# Patient Record
Sex: FEMALE | Race: AMERICAN INDIAN OR ALASKA NATIVE | URBAN - METROPOLITAN AREA
[De-identification: names, ages, dates, MRNs, and addresses within clinical notes are randomized per-mention and may not be internally consistent; named-entity substitution may affect disease eponyms.]

---

## 2020-02-20 ENCOUNTER — INPATIENT (INPATIENT)
Facility: HOSPITAL | Age: 85
LOS: 0 days | Discharge: HOME | DRG: 392 | End: 2020-02-21
Attending: STUDENT IN AN ORGANIZED HEALTH CARE EDUCATION/TRAINING PROGRAM | Admitting: STUDENT IN AN ORGANIZED HEALTH CARE EDUCATION/TRAINING PROGRAM
Payer: COMMERCIAL

## 2020-02-20 VITALS
RESPIRATION RATE: 18 BRPM | TEMPERATURE: 98 F | HEART RATE: 90 BPM | HEIGHT: 62 IN | OXYGEN SATURATION: 99 % | SYSTOLIC BLOOD PRESSURE: 146 MMHG | WEIGHT: 126.1 LBS | DIASTOLIC BLOOD PRESSURE: 68 MMHG

## 2020-02-20 DIAGNOSIS — R42 DIZZINESS AND GIDDINESS: ICD-10-CM

## 2020-02-20 LAB
ALBUMIN SERPL ELPH-MCNC: 4.3 G/DL — SIGNIFICANT CHANGE UP (ref 3.3–5)
ALP SERPL-CCNC: 108 U/L — SIGNIFICANT CHANGE UP (ref 40–120)
ALT FLD-CCNC: 12 U/L — SIGNIFICANT CHANGE UP (ref 10–45)
ANION GAP SERPL CALC-SCNC: 15 MMOL/L — SIGNIFICANT CHANGE UP (ref 5–17)
APPEARANCE UR: CLEAR — SIGNIFICANT CHANGE UP
APTT BLD: 31.8 SEC — SIGNIFICANT CHANGE UP (ref 27.5–36.3)
AST SERPL-CCNC: 20 U/L — SIGNIFICANT CHANGE UP (ref 10–40)
BACTERIA # UR AUTO: NEGATIVE — SIGNIFICANT CHANGE UP
BASOPHILS # BLD AUTO: 0.03 K/UL — SIGNIFICANT CHANGE UP (ref 0–0.2)
BASOPHILS NFR BLD AUTO: 0.4 % — SIGNIFICANT CHANGE UP (ref 0–2)
BILIRUB SERPL-MCNC: 0.4 MG/DL — SIGNIFICANT CHANGE UP (ref 0.2–1.2)
BILIRUB UR-MCNC: NEGATIVE — SIGNIFICANT CHANGE UP
BUN SERPL-MCNC: 12 MG/DL — SIGNIFICANT CHANGE UP (ref 7–23)
CALCIUM SERPL-MCNC: 9.8 MG/DL — SIGNIFICANT CHANGE UP (ref 8.4–10.5)
CHLORIDE SERPL-SCNC: 101 MMOL/L — SIGNIFICANT CHANGE UP (ref 96–108)
CO2 SERPL-SCNC: 23 MMOL/L — SIGNIFICANT CHANGE UP (ref 22–31)
COLOR SPEC: COLORLESS — SIGNIFICANT CHANGE UP
CREAT SERPL-MCNC: 0.6 MG/DL — SIGNIFICANT CHANGE UP (ref 0.5–1.3)
DIFF PNL FLD: NEGATIVE — SIGNIFICANT CHANGE UP
EOSINOPHIL # BLD AUTO: 0.01 K/UL — SIGNIFICANT CHANGE UP (ref 0–0.5)
EOSINOPHIL NFR BLD AUTO: 0.1 % — SIGNIFICANT CHANGE UP (ref 0–6)
EPI CELLS # UR: 0 /HPF — SIGNIFICANT CHANGE UP
GLUCOSE SERPL-MCNC: 118 MG/DL — HIGH (ref 70–99)
GLUCOSE UR QL: NEGATIVE — SIGNIFICANT CHANGE UP
HCT VFR BLD CALC: 40.3 % — SIGNIFICANT CHANGE UP (ref 34.5–45)
HGB BLD-MCNC: 12.3 G/DL — SIGNIFICANT CHANGE UP (ref 11.5–15.5)
HYALINE CASTS # UR AUTO: 0 /LPF — SIGNIFICANT CHANGE UP (ref 0–7)
IMM GRANULOCYTES NFR BLD AUTO: 0.3 % — SIGNIFICANT CHANGE UP (ref 0–1.5)
INR BLD: 0.99 RATIO — SIGNIFICANT CHANGE UP (ref 0.88–1.16)
KETONES UR-MCNC: NEGATIVE — SIGNIFICANT CHANGE UP
LEUKOCYTE ESTERASE UR-ACNC: NEGATIVE — SIGNIFICANT CHANGE UP
LYMPHOCYTES # BLD AUTO: 1.64 K/UL — SIGNIFICANT CHANGE UP (ref 1–3.3)
LYMPHOCYTES # BLD AUTO: 22.8 % — SIGNIFICANT CHANGE UP (ref 13–44)
MCHC RBC-ENTMCNC: 25.9 PG — LOW (ref 27–34)
MCHC RBC-ENTMCNC: 30.5 GM/DL — LOW (ref 32–36)
MCV RBC AUTO: 85 FL — SIGNIFICANT CHANGE UP (ref 80–100)
MONOCYTES # BLD AUTO: 0.38 K/UL — SIGNIFICANT CHANGE UP (ref 0–0.9)
MONOCYTES NFR BLD AUTO: 5.3 % — SIGNIFICANT CHANGE UP (ref 2–14)
NEUTROPHILS # BLD AUTO: 5.12 K/UL — SIGNIFICANT CHANGE UP (ref 1.8–7.4)
NEUTROPHILS NFR BLD AUTO: 71.1 % — SIGNIFICANT CHANGE UP (ref 43–77)
NITRITE UR-MCNC: NEGATIVE — SIGNIFICANT CHANGE UP
NRBC # BLD: 0 /100 WBCS — SIGNIFICANT CHANGE UP (ref 0–0)
PH UR: 6 — SIGNIFICANT CHANGE UP (ref 5–8)
PLATELET # BLD AUTO: 312 K/UL — SIGNIFICANT CHANGE UP (ref 150–400)
POTASSIUM SERPL-MCNC: 4.2 MMOL/L — SIGNIFICANT CHANGE UP (ref 3.5–5.3)
POTASSIUM SERPL-SCNC: 4.2 MMOL/L — SIGNIFICANT CHANGE UP (ref 3.5–5.3)
PROT SERPL-MCNC: 7.9 G/DL — SIGNIFICANT CHANGE UP (ref 6–8.3)
PROT UR-MCNC: NEGATIVE — SIGNIFICANT CHANGE UP
PROTHROM AB SERPL-ACNC: 11.4 SEC — SIGNIFICANT CHANGE UP (ref 10–12.9)
RBC # BLD: 4.74 M/UL — SIGNIFICANT CHANGE UP (ref 3.8–5.2)
RBC # FLD: 13.4 % — SIGNIFICANT CHANGE UP (ref 10.3–14.5)
RBC CASTS # UR COMP ASSIST: 0 /HPF — SIGNIFICANT CHANGE UP (ref 0–4)
SODIUM SERPL-SCNC: 139 MMOL/L — SIGNIFICANT CHANGE UP (ref 135–145)
SP GR SPEC: 1 — LOW (ref 1.01–1.02)
UROBILINOGEN FLD QL: NEGATIVE — SIGNIFICANT CHANGE UP
WBC # BLD: 7.2 K/UL — SIGNIFICANT CHANGE UP (ref 3.8–10.5)
WBC # FLD AUTO: 7.2 K/UL — SIGNIFICANT CHANGE UP (ref 3.8–10.5)
WBC UR QL: 0 /HPF — SIGNIFICANT CHANGE UP (ref 0–5)

## 2020-02-20 PROCEDURE — 70498 CT ANGIOGRAPHY NECK: CPT | Mod: 26

## 2020-02-20 PROCEDURE — 99291 CRITICAL CARE FIRST HOUR: CPT

## 2020-02-20 PROCEDURE — 71260 CT THORAX DX C+: CPT | Mod: 26

## 2020-02-20 PROCEDURE — 70496 CT ANGIOGRAPHY HEAD: CPT | Mod: 26

## 2020-02-20 RX ORDER — SODIUM CHLORIDE 9 MG/ML
1000 INJECTION INTRAMUSCULAR; INTRAVENOUS; SUBCUTANEOUS ONCE
Refills: 0 | Status: COMPLETED | OUTPATIENT
Start: 2020-02-20 | End: 2020-02-20

## 2020-02-20 RX ORDER — BENZOCAINE AND MENTHOL 5; 1 G/100ML; G/100ML
1 LIQUID ORAL ONCE
Refills: 0 | Status: COMPLETED | OUTPATIENT
Start: 2020-02-20 | End: 2020-02-20

## 2020-02-20 RX ORDER — SODIUM CHLORIDE 9 MG/ML
1000 INJECTION, SOLUTION INTRAVENOUS
Refills: 0 | Status: DISCONTINUED | OUTPATIENT
Start: 2020-02-20 | End: 2020-02-21

## 2020-02-20 RX ADMIN — Medication 1 MILLIGRAM(S): at 20:33

## 2020-02-20 RX ADMIN — SODIUM CHLORIDE 1000 MILLILITER(S): 9 INJECTION INTRAMUSCULAR; INTRAVENOUS; SUBCUTANEOUS at 18:59

## 2020-02-20 RX ADMIN — BENZOCAINE AND MENTHOL 1 LOZENGE: 5; 1 LIQUID ORAL at 22:45

## 2020-02-20 NOTE — ED PROVIDER NOTE - CRITICAL CARE PROVIDED
interpretation of diagnostic studies/consult w/ pt's family directly relating to pts condition/documentation/additional history taking/consultation with other physicians/direct patient care (not related to procedure)

## 2020-02-20 NOTE — ED PROVIDER NOTE - PROGRESS NOTE DETAILS
patient unable to swallow at this time and feels unable to eat drink, gi consulted, awaiting neuro recommendations, Hospitalist paged for admission. Hospitalist accepts patient   Based on patient's history and physical exam, as well as the results of today's workup, I feel that patient warrants admission to the hospital for further workup/evaluation and continued management. I discussed the findings of today's workup with the patient and addressed the patient's questions and concerns. The patient was agreeable with admission. Our team spoke with the inpatient receiving team who accepted the patient for admission and subsequently took over the patient's care at the time of admission.

## 2020-02-20 NOTE — ED ADULT NURSE NOTE - NSIMPLEMENTINTERV_GEN_ALL_ED
Implemented All Universal Safety Interventions:  [place] to call system. Call bell, personal items and telephone within reach. Instruct patient to call for assistance. Room bathroom lighting operational. Non-slip footwear when patient is off stretcher. Physically safe environment: no spills, clutter or unnecessary equipment. Stretcher in lowest position, wheels locked, appropriate side rails in place.

## 2020-02-20 NOTE — CONSULT NOTE ADULT - SUBJECTIVE AND OBJECTIVE BOX
MRN-[id number]  chief complaint: vertigo    HPI: 83 yo F with PMH of DM, HTN, HLD presents to the ED with vertigo. Family assists in providing the history. Family reports that she developed room spinning sensation on . Symptoms are on and off. She reports that symptoms are worse when sleeping. Denies that symptoms are particularly worse with movement; she states that symptoms are on and off either at rest or with movement. no nausea, vomiting, diplopia, runny nose, fevers. no dysarthria. family reports patient has been having odynophagia and dry cough. family also reports patient has been having unsteady gait. at baseline, she walks independently. family reports that she has had multiple episodes like this in the past, that can last up to a week, and then spontaneously resolve. she has taken meclizine in the past which had previously helped. this time, meclizine was not helping. no tinnitus, numbness.      PAST MEDICAL & SURGICAL HISTORY:  DM, HTN, HLD    FAMILY HISTORY: n.c    Social Hx:  lives with family    Home Medications:    MEDICATIONS  (STANDING):  dextrose 5% + sodium chloride 0.45%. 1000 milliLiter(s) (125 mL/Hr) IV Continuous <Continuous>    Allergies  No Known Allergies    REVIEW OF SYSTEMS	    ROS: Pertinent positives in HPI, all other ROS were reviewed and are negative.      Vital Signs Last 24 Hrs  T(C): 37.1 (2020 17:42), Max: 37.1 (2020 17:42)  T(F): 98.8 (2020 17:42), Max: 98.8 (2020 17:42)  HR: 63 (2020 21:45) (63 - 90)  BP: 132/67 (2020 21:45) (132/67 - 154/72)  BP(mean): --  RR: 16 (2020 21:45) (16 - 18)  SpO2: 100% (2020 21:45) (99% - 100%)    GENERAL EXAM:  Constitutional: awake and alert. NAD  Extremities: no edema, no cyanosis  Vascular: no carotid bruits  Musculoskeletal: no joint swelling/tenderness, no abnormal movements  Skin: no rashes    NEUROLOGICAL EXAM:  MS: awake and alert, oriented to person, place, not to month or year, fluent, attends b/l; normal attention. some difficulty follow commands.  CN: VFF, EOMI, no facial asymmetry  Motor: Strength: 5/5 4x.   DTR 2+ symm.    Coordination: no dysmetria  Gait: walks with narrow base, unsteady on feet    +head thrust with corrective saccade    Labs:   cbc                      12.3   7.20  )-----------( 312      ( 2020 18:50 )             40.3     [id number]    139  |  101  |  12  ----------------------------<  118<H>  4.2   |  23  |  0.60    Ca    9.8      2020 18:50    TPro  7.9  /  Alb  4.3  /  TBili  0.4  /  DBili  x   /  AST  20  /  ALT  12  /  AlkPhos  108  02-20    CoagsPT/INR - ( 2020 18:50 )   PT: 11.4 sec;   INR: 0.99 ratio         PTT - ( 2020 18:50 )  PTT:31.8 sec    LFTsLIVER FUNCTIONS - ( 2020 18:50 )  Alb: 4.3 g/dL / Pro: 7.9 g/dL / ALK PHOS: 108 U/L / ALT: 12 U/L / AST: 20 U/L / GGT: x           UAUrinalysis Basic - ( 2020 18:49 )    Color: Colorless / Appearance: Clear / S.005 / pH: x  Gluc: x / Ketone: Negative  / Bili: Negative / Urobili: Negative   Blood: x / Protein: Negative / Nitrite: Negative   Leuk Esterase: Negative / RBC: 0 /hpf / WBC 0 /HPF   Sq Epi: x / Non Sq Epi: 0 /hpf / Bacteria: Negative      CSF  Immunological Labs    Radiology:  -CT Head: < from: CT Head No Cont (20 @ 21:33) >  IMPRESSION:     CT brain: No acute intracranial hemorrhage, mass effect, midline shift.     CTA neck: No hemodynamically significant stenosis by NASCET criteria. No vascular dissection.    CTA brain: No major vessel occlusion or proximal stenosis. No aneurysm or vascular malformation.    < end of copied text >

## 2020-02-20 NOTE — ED PROVIDER NOTE - ATTENDING CONTRIBUTION TO CARE
Satish Mata MD, FACEP: In this physician's medical judgement based on clinical history and physical exam, patient with foreign body sensation in upper throat and difficulty swallowing x 2-3 days. Patient had symptoms of vertigo 7 days prior. Left neck with rash, dry, red, itching.  CN 2 normal sight, 3,4,6 eomi and eyelid movement normal, 5,7 normal gritting of teeth and opening of mouth and sensation to face, 8 normal hearing for patient, 9,10 normal swallowing and phonation, 11 normal shoulder shrug, 12 normal tongue movement and articulation, normal coordination, normal finger to nose, normal heel to shin, negative Romberg, no pronator drift, no gait testing, 5/5 strength in upper and lower extremities, normal sensory throughout, alert and oriented to person, place, time, and situation.   left neck with dry erythematous rash with some flaking of skin  mmm  trachea midline, no thyroid masses detected  soft, non-distended   concern for central vertigionus symptoms and now difficulty swallowing  Satish Mata MD, FACEP: In this physician's medical judgement based on clinical history and physical exam, possible posterior cva/central cva, will get iv, cbc, cmp, ekg, ent consult and reassess   Will follow up on labs, analgesia, imaging, reassess and disposition as clinically indicated. Satish Mata MD, FACEP: In this physician's medical judgement based on clinical history and physical exam, patient with foreign body sensation in upper throat and difficulty swallowing x 2-3 days. Patient had symptoms of vertigo 7 days prior. Left neck with rash, dry, red, itching.  CN 2 normal sight, 3,4,6 eomi and eyelid movement normal, 5,7 normal gritting of teeth and opening of mouth and sensation to face, 8 normal hearing for patient, 9,10 normal swallowing and phonation, 11 normal shoulder shrug, 12 normal tongue movement and articulation, normal coordination, normal finger to nose, normal heel to shin, negative Romberg, no pronator drift, no gait testing, 5/5 strength in upper and lower extremities, normal sensory throughout, alert and oriented to person, place, time, and situation.   left neck with dry erythematous rash with some flaking of skin  mmm  trachea midline, no thyroid masses detected  soft, non-distended   concern for central vertiginous symptoms and now difficulty swallowing  Satish Mata MD, FACEP: In this physician's medical judgement based on clinical history and physical exam, possible posterior cva/central cva, with additional issue of difficulty swallowing with questionable impaction/ achalasia/ dysmotility  will get iv, cbc, cmp, ekg, ent consult and reassess   Will follow up on labs, analgesia, imaging, reassess and disposition as clinically indicated.

## 2020-02-20 NOTE — ED PROVIDER NOTE - OBJECTIVE STATEMENT
84 y.o. female coming in with dizziness x 1 weeks and feeling of something stuck in her chest and difficult swallowing for 3 days.  Pt started with feeling of room spinning 7 days ago, was seen by PCP started on meclizine without improvement.  Dizziness is all the time without nausea or vomiting.  Not lightheaded, no CP or SoB.  No focal deficit, not made better or worse by head movement/rest.  Pt also slowly developed a feeling of a lump deep in her upper chest about 4 days ago and now is having difficulty swallowing foods.  Also noters some SoB without cough or wheezing.  Did not come on after eating, currently only tolerating water.  Nothing makes this better, swallowing makes it worse.

## 2020-02-20 NOTE — ED ADULT NURSE NOTE - OBJECTIVE STATEMENT
85 y/o female presents c/o room like spinning for the past week. States she was evaluated by PCP and given Meclozine with no relief. Also c/o 3-4 days ago onset of pain in throat, difficulty swallowing, decreased PO intake. Denies chest pain, sob, n/v/d, abdominal pain, f/c, urinary symptoms, hematuria. A&Ox4, hypertensive, skin warm dry and intact, MAEx4, lungs CTA, abd soft nondistended. Patient's bed in the lowest position, explained plan of care to patient and family members. Will continue to reassess.

## 2020-02-20 NOTE — ED ADULT NURSE NOTE - CHIEF COMPLAINT QUOTE
difficulty swallowing, feels "lump" in throat, "trouble breathing", decreased po intake, itching and redness around neck

## 2020-02-21 ENCOUNTER — TRANSCRIPTION ENCOUNTER (OUTPATIENT)
Age: 85
End: 2020-02-21

## 2020-02-21 VITALS
TEMPERATURE: 98 F | HEART RATE: 55 BPM | SYSTOLIC BLOOD PRESSURE: 129 MMHG | OXYGEN SATURATION: 96 % | DIASTOLIC BLOOD PRESSURE: 59 MMHG | RESPIRATION RATE: 18 BRPM

## 2020-02-21 DIAGNOSIS — R09.89 OTHER SPECIFIED SYMPTOMS AND SIGNS INVOLVING THE CIRCULATORY AND RESPIRATORY SYSTEMS: ICD-10-CM

## 2020-02-21 DIAGNOSIS — E11.9 TYPE 2 DIABETES MELLITUS WITHOUT COMPLICATIONS: ICD-10-CM

## 2020-02-21 DIAGNOSIS — I10 ESSENTIAL (PRIMARY) HYPERTENSION: ICD-10-CM

## 2020-02-21 DIAGNOSIS — R13.10 DYSPHAGIA, UNSPECIFIED: ICD-10-CM

## 2020-02-21 DIAGNOSIS — Z02.9 ENCOUNTER FOR ADMINISTRATIVE EXAMINATIONS, UNSPECIFIED: ICD-10-CM

## 2020-02-21 DIAGNOSIS — E78.5 HYPERLIPIDEMIA, UNSPECIFIED: ICD-10-CM

## 2020-02-21 DIAGNOSIS — Z29.9 ENCOUNTER FOR PROPHYLACTIC MEASURES, UNSPECIFIED: ICD-10-CM

## 2020-02-21 DIAGNOSIS — R42 DIZZINESS AND GIDDINESS: ICD-10-CM

## 2020-02-21 LAB
CULTURE RESULTS: SIGNIFICANT CHANGE UP
GLUCOSE BLDC GLUCOMTR-MCNC: 85 MG/DL — SIGNIFICANT CHANGE UP (ref 70–99)
GLUCOSE BLDC GLUCOMTR-MCNC: 87 MG/DL — SIGNIFICANT CHANGE UP (ref 70–99)
HCOV PNL SPEC NAA+PROBE: DETECTED
RAPID RVP RESULT: DETECTED
SPECIMEN SOURCE: SIGNIFICANT CHANGE UP

## 2020-02-21 PROCEDURE — 70450 CT HEAD/BRAIN W/O DYE: CPT

## 2020-02-21 PROCEDURE — 71260 CT THORAX DX C+: CPT

## 2020-02-21 PROCEDURE — 31525 DX LARYNGOSCOPY EXCL NB: CPT

## 2020-02-21 PROCEDURE — 87486 CHLMYD PNEUM DNA AMP PROBE: CPT

## 2020-02-21 PROCEDURE — 85610 PROTHROMBIN TIME: CPT

## 2020-02-21 PROCEDURE — 99222 1ST HOSP IP/OBS MODERATE 55: CPT | Mod: GC

## 2020-02-21 PROCEDURE — 99222 1ST HOSP IP/OBS MODERATE 55: CPT

## 2020-02-21 PROCEDURE — 81001 URINALYSIS AUTO W/SCOPE: CPT

## 2020-02-21 PROCEDURE — 87633 RESP VIRUS 12-25 TARGETS: CPT

## 2020-02-21 PROCEDURE — 70498 CT ANGIOGRAPHY NECK: CPT

## 2020-02-21 PROCEDURE — 85027 COMPLETE CBC AUTOMATED: CPT

## 2020-02-21 PROCEDURE — 31505 DIAGNOSTIC LARYNGOSCOPY: CPT

## 2020-02-21 PROCEDURE — 85730 THROMBOPLASTIN TIME PARTIAL: CPT

## 2020-02-21 PROCEDURE — 12345: CPT | Mod: NC,GC

## 2020-02-21 PROCEDURE — 93005 ELECTROCARDIOGRAM TRACING: CPT | Mod: XU

## 2020-02-21 PROCEDURE — 87581 M.PNEUMON DNA AMP PROBE: CPT

## 2020-02-21 PROCEDURE — 87798 DETECT AGENT NOS DNA AMP: CPT

## 2020-02-21 PROCEDURE — 96374 THER/PROPH/DIAG INJ IV PUSH: CPT | Mod: XU

## 2020-02-21 PROCEDURE — 99223 1ST HOSP IP/OBS HIGH 75: CPT | Mod: GC

## 2020-02-21 PROCEDURE — 80053 COMPREHEN METABOLIC PANEL: CPT

## 2020-02-21 PROCEDURE — 99285 EMERGENCY DEPT VISIT HI MDM: CPT | Mod: 25

## 2020-02-21 PROCEDURE — 70496 CT ANGIOGRAPHY HEAD: CPT

## 2020-02-21 PROCEDURE — 87086 URINE CULTURE/COLONY COUNT: CPT

## 2020-02-21 PROCEDURE — 82962 GLUCOSE BLOOD TEST: CPT

## 2020-02-21 RX ORDER — PANTOPRAZOLE SODIUM 20 MG/1
40 TABLET, DELAYED RELEASE ORAL
Refills: 0 | Status: DISCONTINUED | OUTPATIENT
Start: 2020-02-21 | End: 2020-02-21

## 2020-02-21 RX ORDER — SODIUM CHLORIDE 9 MG/ML
1000 INJECTION, SOLUTION INTRAVENOUS
Refills: 0 | Status: DISCONTINUED | OUTPATIENT
Start: 2020-02-21 | End: 2020-02-21

## 2020-02-21 RX ORDER — PANTOPRAZOLE SODIUM 20 MG/1
1 TABLET, DELAYED RELEASE ORAL
Qty: 30 | Refills: 0
Start: 2020-02-21 | End: 2020-03-21

## 2020-02-21 RX ORDER — GLUCAGON INJECTION, SOLUTION 0.5 MG/.1ML
1 INJECTION, SOLUTION SUBCUTANEOUS ONCE
Refills: 0 | Status: DISCONTINUED | OUTPATIENT
Start: 2020-02-21 | End: 2020-02-21

## 2020-02-21 RX ORDER — ATORVASTATIN CALCIUM 80 MG/1
20 TABLET, FILM COATED ORAL AT BEDTIME
Refills: 0 | Status: DISCONTINUED | OUTPATIENT
Start: 2020-02-21 | End: 2020-02-21

## 2020-02-21 RX ORDER — ATORVASTATIN CALCIUM 80 MG/1
1 TABLET, FILM COATED ORAL
Qty: 0 | Refills: 0 | DISCHARGE

## 2020-02-21 RX ORDER — AMLODIPINE BESYLATE 2.5 MG/1
5 TABLET ORAL DAILY
Refills: 0 | Status: DISCONTINUED | OUTPATIENT
Start: 2020-02-21 | End: 2020-02-21

## 2020-02-21 RX ORDER — LISINOPRIL 2.5 MG/1
1 TABLET ORAL
Qty: 0 | Refills: 0 | DISCHARGE

## 2020-02-21 RX ORDER — ACETAMINOPHEN 500 MG
1000 TABLET ORAL ONCE
Refills: 0 | Status: DISCONTINUED | OUTPATIENT
Start: 2020-02-21 | End: 2020-02-21

## 2020-02-21 RX ORDER — INSULIN LISPRO 100/ML
VIAL (ML) SUBCUTANEOUS
Refills: 0 | Status: DISCONTINUED | OUTPATIENT
Start: 2020-02-21 | End: 2020-02-21

## 2020-02-21 RX ORDER — LIDOCAINE 4 G/100G
15 CREAM TOPICAL
Qty: 840 | Refills: 0
Start: 2020-02-21 | End: 2020-02-27

## 2020-02-21 RX ORDER — MECLIZINE HCL 12.5 MG
25 TABLET ORAL
Refills: 0 | Status: DISCONTINUED | OUTPATIENT
Start: 2020-02-21 | End: 2020-02-21

## 2020-02-21 RX ORDER — NYSTATIN 500MM UNIT
5 POWDER (EA) MISCELLANEOUS
Qty: 120 | Refills: 0
Start: 2020-02-21 | End: 2020-02-26

## 2020-02-21 RX ORDER — DEXTROSE 50 % IN WATER 50 %
12.5 SYRINGE (ML) INTRAVENOUS ONCE
Refills: 0 | Status: DISCONTINUED | OUTPATIENT
Start: 2020-02-21 | End: 2020-02-21

## 2020-02-21 RX ORDER — DEXTROSE 50 % IN WATER 50 %
25 SYRINGE (ML) INTRAVENOUS ONCE
Refills: 0 | Status: DISCONTINUED | OUTPATIENT
Start: 2020-02-21 | End: 2020-02-21

## 2020-02-21 RX ORDER — AMLODIPINE BESYLATE 2.5 MG/1
1 TABLET ORAL
Qty: 0 | Refills: 0 | DISCHARGE

## 2020-02-21 RX ORDER — NYSTATIN 500MM UNIT
500000 POWDER (EA) MISCELLANEOUS EVERY 6 HOURS
Refills: 0 | Status: DISCONTINUED | OUTPATIENT
Start: 2020-02-21 | End: 2020-02-21

## 2020-02-21 RX ORDER — LISINOPRIL 2.5 MG/1
2.5 TABLET ORAL DAILY
Refills: 0 | Status: DISCONTINUED | OUTPATIENT
Start: 2020-02-21 | End: 2020-02-21

## 2020-02-21 RX ORDER — DEXTROSE 50 % IN WATER 50 %
15 SYRINGE (ML) INTRAVENOUS ONCE
Refills: 0 | Status: DISCONTINUED | OUTPATIENT
Start: 2020-02-21 | End: 2020-02-21

## 2020-02-21 RX ORDER — INSULIN LISPRO 100/ML
VIAL (ML) SUBCUTANEOUS AT BEDTIME
Refills: 0 | Status: DISCONTINUED | OUTPATIENT
Start: 2020-02-21 | End: 2020-02-21

## 2020-02-21 RX ORDER — METFORMIN HYDROCHLORIDE 850 MG/1
1 TABLET ORAL
Qty: 0 | Refills: 0 | DISCHARGE

## 2020-02-21 RX ORDER — CHLORHEXIDINE GLUCONATE 213 G/1000ML
15 SOLUTION TOPICAL
Qty: 210 | Refills: 0
Start: 2020-02-21 | End: 2020-02-27

## 2020-02-21 RX ADMIN — Medication 500000 UNIT(S): at 05:42

## 2020-02-21 RX ADMIN — Medication 25 MILLIGRAM(S): at 05:42

## 2020-02-21 RX ADMIN — AMLODIPINE BESYLATE 5 MILLIGRAM(S): 2.5 TABLET ORAL at 05:42

## 2020-02-21 RX ADMIN — SODIUM CHLORIDE 50 MILLILITER(S): 9 INJECTION, SOLUTION INTRAVENOUS at 12:24

## 2020-02-21 RX ADMIN — LISINOPRIL 2.5 MILLIGRAM(S): 2.5 TABLET ORAL at 05:42

## 2020-02-21 RX ADMIN — SODIUM CHLORIDE 75 MILLILITER(S): 9 INJECTION, SOLUTION INTRAVENOUS at 02:56

## 2020-02-21 RX ADMIN — SODIUM CHLORIDE 125 MILLILITER(S): 9 INJECTION, SOLUTION INTRAVENOUS at 00:41

## 2020-02-21 RX ADMIN — PANTOPRAZOLE SODIUM 40 MILLIGRAM(S): 20 TABLET, DELAYED RELEASE ORAL at 05:42

## 2020-02-21 NOTE — CONSULT NOTE ADULT - SUBJECTIVE AND OBJECTIVE BOX
Chief Complaint:  Patient is a 84y old  Female who presents with a chief complaint of Sore throat and odynophagia (2020 07:37)      HPI: Pt is an 85 yo F with PMH of HTN, HLD, T2DM, and intermittent vertigo for the past 10 years who presents with cough, sore throat and odynophagia for the past 3-4 days. GI consulted for odynophagia and abrnoaml CT. Pt has been having dry cough for few days. Then developed pain with swallowing right after the intiial swallow. Never hd this prior. She additionalyl ocasionally feels food stuck in chest. Recently placed on meclizine for vertigo but not other changes. Denies fever or chills, shortness of breath. she had episode of food intolerance but may have been related to vertigo. Denies any food getting stuck completely or difficulty with drooling or not tolerating saliva. Denies melena or hematochezia. No weight loss. No prior smoking or alcohol use. From [facility] living in  for 35 years w/o recent travels. no prior EGD.    Allergies:  No Known Allergies      Home Medications:    Hospital Medications:  acetaminophen  IVPB .. 1000 milliGRAM(s) IV Intermittent once PRN  amLODIPine   Tablet 5 milliGRAM(s) Oral daily  atorvastatin 20 milliGRAM(s) Oral at bedtime  dextrose 40% Gel 15 Gram(s) Oral once PRN  dextrose 5% + lactated ringers. 1000 milliLiter(s) IV Continuous <Continuous>  dextrose 5%. 1000 milliLiter(s) IV Continuous <Continuous>  dextrose 50% Injectable 12.5 Gram(s) IV Push once  dextrose 50% Injectable 25 Gram(s) IV Push once  dextrose 50% Injectable 25 Gram(s) IV Push once  glucagon  Injectable 1 milliGRAM(s) IntraMuscular once PRN  insulin lispro (HumaLOG) corrective regimen sliding scale   SubCutaneous three times a day before meals  insulin lispro (HumaLOG) corrective regimen sliding scale   SubCutaneous at bedtime  lisinopril 2.5 milliGRAM(s) Oral daily  meclizine 25 milliGRAM(s) Oral two times a day  pantoprazole    Tablet 40 milliGRAM(s) Oral before breakfast      PMHX/PSHX:  Diabetes  Hyperlipidemia  Hypertension  Vertigo  No significant past surgical history      Family history:  No pertinent family history in first degree relatives      Social History:     ROS:     General:  No wt loss, fevers, chills, night sweats, fatigue,   Eyes:  Good vision, no reported pain  ENT:  No sore throat, pain, runny nose, dysphagia  CV:  No pain, palpitations, hypo/hypertension  Resp:  No dyspnea, cough, tachypnea, wheezing  GI:  See HPI  :  No pain, bleeding, incontinence, nocturia  Muscle:  No pain, weakness  Neuro:  No weakness, tingling, memory problems  Psych:  No fatigue, insomnia, mood problems, depression  Endocrine:  No polyuria, polydipsia, cold/heat intolerance  Heme:  No petechiae, ecchymosis, easy bruisability  Skin:  No rash, edema      PHYSICAL EXAM:     Vital Signs:  Vital Signs Last 24 Hrs  T(C): 36.9 (2020 12:23), Max: 37.1 (2020 17:42)  T(F): 98.4 (2020 12:23), Max: 98.8 (2020 17:42)  HR: 59 (2020 12:23) (51 - 90)  BP: 150/66 (2020 12:23) (123/52 - 154/72)  BP(mean): --  RR: 16 (2020 12:23) (16 - 18)  SpO2: 99% (2020 12:23) (98% - 100%)  Daily Height in cm: 157.48 (2020 16:54)    Daily     GENERAL:  Appears stated age, well-groomed, well-nourished, no distress  HEENT:  NC/AT,  conjunctivae clear and pink  CHEST:  Full & symmetric excursion, no increased effort  HEART:  Regular rhythm, no JVD  ABDOMEN:  Soft, non-tender, non-distended, normoactive bowel sounds,  no masses , no hepatosplenomegaly  EXTREMITIES:  no cyanosis, clubbing or edema  SKIN:  No rash/erythema/ecchymoses/petechiae/wounds/abscess/warm/dry  NEURO:  Alert, oriented, nonfocal    LABS:                        12.3   7.20  )-----------( 312      ( 2020 18:50 )             40.3     02-20    139  |  101  |  12  ----------------------------<  118<H>  4.2   |  23  |  0.60    Ca    9.8      2020 18:50    TPro  7.9  /  Alb  4.3  /  TBili  0.4  /  DBili  x   /  AST  20  /  ALT  12  /  AlkPhos  108      LIVER FUNCTIONS - ( 2020 18:50 )  Alb: 4.3 g/dL / Pro: 7.9 g/dL / ALK PHOS: 108 U/L / ALT: 12 U/L / AST: 20 U/L / GGT: x           PT/INR - ( 2020 18:50 )   PT: 11.4 sec;   INR: 0.99 ratio         PTT - ( 2020 18:50 )  PTT:31.8 sec  Urinalysis Basic - ( 2020 18:49 )    Color: Colorless / Appearance: Clear / S.005 / pH: x  Gluc: x / Ketone: Negative  / Bili: Negative / Urobili: Negative   Blood: x / Protein: Negative / Nitrite: Negative   Leuk Esterase: Negative / RBC: 0 /hpf / WBC 0 /HPF   Sq Epi: x / Non Sq Epi: 0 /hpf / Bacteria: Negative    Imaging:    < from: CT Chest w/ IV Cont (20 @ 21:21) >  FINDINGS: Artifact from the patient's arms and motion degrades images.    LUNGS AND AIRWAYS: Patent central airways. No focal consolidation. Subsegmental bibasilar atelectasis.    PLEURA: No pleural effusion or pneumothorax.    MEDIASTINUM AND VERONICA: Subcentimeter lymph nodes without lymphadenopathy.    VESSELS: Atherosclerotic change of the thoracic aorta, great vessel and coronary arteries. Ectatic ascending aorta (4.0 cm). Mildly enlarged main pulmonary artery (3.2 cm),which can be seen in pulmonary arterial hypertension.      HEART: Mildly enlarged heart. No pericardial effusion.     CHEST WALL AND LOWER NECK: Heterogeneous thyroid gland with small hypodense foci.    VISUALIZED UPPER ABDOMEN: Diffusely prominent wall of the esophagus containing debris. Partially distended stomach, limiting evaluation. Right hepatic calcified granuloma. Colon diverticulosis. Excluded contrast in the visualized proximal renal collecting system.    BONES: Osteopenia. Leftward curvature and degenerative changes of the spine. Small loose bodies in the region of the left subcoracoid bursa.    IMPRESSION:     Diffusely prominent wall of the esophagus, which may be due to partial distention and/or esophagitis although underlying lesion/neoplasm cannot be excluded. Recommend clinical correlation and follow-up upper endoscopy.    Heterogeneous thyroid gland, which may further characterized on a nonemergent ultrasound.    Additional findings as described.

## 2020-02-21 NOTE — PROGRESS NOTE ADULT - ATTENDING COMMENTS
Patient seen and examined with resident team. Evaluated by ENT and GI. Plan for outpatient endoscopy next week (arranged for Wednesday). She is clinically stable, non-toxic appearing. No drooling, respiratory distress, or signs of airway compromise and she was scoped by ENT which showed findings consistent with GERD.  Labs all within normal limits. RVP positive for Coronavirus.   1. Odynaphagia: Planned for outpatient endoscopy. Will D/C with Viscous lido, nystatin swish and swallow.   2. Coronavirus: Continue with supportive care  Discussed with family at bedside. 36 minutes spent discharge planning.

## 2020-02-21 NOTE — CONSULT NOTE ADULT - PROBLEM SELECTOR RECOMMENDATION 9
FOE: Airway patent, no foreign bodies visualized, Post cricoid edema, pachydermia consistent with GERD.   - Recommend PPI BID.   - CT Chest: Diffusely prominent wall of the esophagus, which may be due to partial distention and/or esophagitis although underlying lesion/neoplasm cannot be excluded. Recommend clinical correlation and follow-up upper endoscopy.   - GI consult.   - Speech and swallow evaluation for dysphagia.   - Call ENT with questions.     MONIQUE COTTO PA-C.   # [id number]  ENT PA. FOE: Airway patent, no foreign bodies visualized, Post cricoid edema, pachydermia consistent with GERD.   - Recommend PPI BID.   - CT Chest: Diffusely prominent wall of the esophagus, which may be due to partial distention and/or esophagitis although underlying lesion/neoplasm cannot be excluded. Recommend clinical correlation and follow-up upper endoscopy.   - GI consult.   - Speech and swallow evaluation for dysphagia.   - Elevate HOB.   - Pain meds prn.   - Call ENT with questions.     MONIQUE COTTO PA-C.   # [id number]  ENT PA.

## 2020-02-21 NOTE — PROGRESS NOTE ADULT - PROBLEM SELECTOR PLAN 2
-Pt with history of intermittent vertigo for 10 years and now with same sxs for the past 5 days. No tinnitus, hearing loss, FND, and CTA head with no acute findings.  -Can also be component of orthostatics as pt with decreased PO intake in setting of odynophagia  -meclizine  -IVF  -vestibular therapy  -f/u neuro recs Pt with history of intermittent vertigo for 10 years and now with same sxs for the past 5 days. No tinnitus, hearing loss, FND, and CTA head with no acute findings. Can also be component of orthostatics as pt with decreased PO intake in setting of odynophagia  - c/w meclizine  - s/p 1L NS in ED; c/w D5Q  - c/w vestibular therapy  -f/u neuro recs

## 2020-02-21 NOTE — DISCHARGE NOTE PROVIDER - NSDCCPCAREPLAN_GEN_ALL_CORE_FT
PRINCIPAL DISCHARGE DIAGNOSIS  Diagnosis: Odynophagia  Assessment and Plan of Treatment: You presented to the hospital because you had pain with swallowing for one week. You received imaging of your throat which revealed non-specific findings if swelling and thickening of your esophagus. The Ear, Nose, Throat doctors performed a laryngoscopy of the back of your throat which did not reveal anything unusual. You were started on nystatin swish and swallow for presumed candida infection. The Gastroenterology doctors were consulted who recommended an outpatient upper endoscopy on February 26th, 2020 at 3 PM at [facility].  Please go to your appointment and follow up with your outpatient gastroenterologist. Please also follow up with your outpatient primary care physician within 1-2 weeks of your discharge.      SECONDARY DISCHARGE DIAGNOSES  Diagnosis: Dizziness  Assessment and Plan of Treatment: When you presented to the hospital you complained of dizziness. You received a CT of your head and your neck arteries which did not show any signs of stroke, narrowing of your arteries, or bleeding. Please take meclizine as needed for your dizzines. If your dizziness worsens or you have persistent nausea/vomiting, please return to the emergency room immediately

## 2020-02-21 NOTE — H&P ADULT - PROBLEM SELECTOR PLAN 6
Transitions of Care Status:  1.  Name of PCP:Dr. Bunch [phone]  2.  PCP Contacted on Admission: [ ] Y    [ ] N    3.  PCP contacted at Discharge: [ ] Y    [ ] N    [ ] N/A  4.  Post-Discharge Appointment Date and Location:  5.  Summary of Handoff given to PCP:

## 2020-02-21 NOTE — PROGRESS NOTE ADULT - PROBLEM SELECTOR PLAN 7
DVT PPx  -SCDs for now until decision of endoscopy DVT PPx:  - SCDs for now until decision of endoscopy

## 2020-02-21 NOTE — PROGRESS NOTE ADULT - PROBLEM SELECTOR PLAN 1
-CT chest with prominent wall of esophagus, distension Vs esophagitis.  -Given the patients complaints of odynophagia, will start on protonix and nystatin swish and swallow, although per radiology read, cannot r/o malignancy with esophageal distension.   -f/u RVP  -f/u ENT recs  -GI consult for endoscopy  -NPO except meds for now until GI states whether they will perform endoscopy Worsening odynophagia in the past week; sensation of something being stuck in her throat. CT chest with prominent wall of esophagus, distension Vs esophagitis. Although, per radiology read, cannot r/o malignancy with esophageal distension.   - c/w protonix and nystatin swish and swallow  - RVP positive for coronavirus  - ENT no acute intervention, per ENT  - GI has seen the patient and is recommending outpatient f/u

## 2020-02-21 NOTE — H&P ADULT - NSHPPHYSICALEXAM_GEN_ALL_CORE
.  VITAL SIGNS:  T(C): 36.8 (02-21-20 @ 02:15), Max: 37.1 (02-20-20 @ 17:42)  T(F): 98.2 (02-21-20 @ 02:15), Max: 98.8 (02-20-20 @ 17:42)  HR: 51 (02-21-20 @ 02:15) (51 - 90)  BP: 123/52 (02-21-20 @ 02:15) (123/52 - 154/72)  BP(mean): --  RR: 18 (02-21-20 @ 02:15) (16 - 18)  SpO2: 98% (02-21-20 @ 02:15) (98% - 100%)  Wt(kg): --    PHYSICAL EXAM:    Constitutional: WDWN resting comfortably in bed; NAD  Head: NC/AT  Eyes: PERRL, EOMI, anicteric sclera  ENT: no nasal discharge, MMM, no erythema or exudates  Neck: supple; no JVD appreciated  Respiratory: CTA B/L; no W/R/R, no increased work of breathing  Cardiac: +S1/S2; RRR; no M/R/G  Gastrointestinal: soft, NT/ND; no rebound or guarding; +BSx4  Extremities: WWP, no cyanosis; no peripheral edema  Musculoskeletal: NROM x4; no joint swelling, tenderness or erythema  Vascular: 2+ radial, DP pulses B/L  Dermatologic: skin warm, dry and intact  Neurologic: Alert and oriented, CNII-XII grossly intact, strength and sensation intact bilat upper and lower extremities. FTN and no dysdiadokinesia. Normal saccades, no nystagmus    Psychiatric: affect and characteristics of appearance, verbalizations, behaviors are appropriate VITAL SIGNS:  T(C): 36.8 (02-21-20 @ 02:15), Max: 37.1 (02-20-20 @ 17:42)  T(F): 98.2 (02-21-20 @ 02:15), Max: 98.8 (02-20-20 @ 17:42)  HR: 51 (02-21-20 @ 02:15) (51 - 90)  BP: 123/52 (02-21-20 @ 02:15) (123/52 - 154/72)  BP(mean): --  RR: 18 (02-21-20 @ 02:15) (16 - 18)  SpO2: 98% (02-21-20 @ 02:15) (98% - 100%)  Wt(kg): --    PHYSICAL EXAM:    Constitutional: WDWN resting comfortably in bed; NAD  Head: NC/AT  Eyes: PERRL, EOMI, anicteric sclera  ENT: no nasal discharge, MMM, no erythema or exudates  Neck: supple; no JVD appreciated  Respiratory: CTA B/L; no W/R/R, no increased work of breathing  Cardiac: +S1/S2; RRR; no M/R/G  Gastrointestinal: soft, NT/ND; no rebound or guarding; +BSx4  Extremities: WWP, no cyanosis; no peripheral edema  Musculoskeletal: NROM x4; no joint swelling, tenderness or erythema  Vascular: 2+ radial, DP pulses B/L  Dermatologic: skin warm, dry and intact  Neurologic: Alert and oriented, CNII-XII grossly intact, strength and sensation intact bilat upper and lower extremities. FTN and no dysdiadokinesia. Normal saccades, no nystagmus    Psychiatric: affect and characteristics of appearance, verbalizations, behaviors are appropriate

## 2020-02-21 NOTE — H&P ADULT - PROBLEM SELECTOR PLAN 2
-Pt with history of intermittent vertigo for 10 years and now with same sxs for the past 5 days. No tinnitus, hearing loss, FND, and CTA head with no acute findings.  -meclizine  -vestibular therapy  -f/u neuro recs -Pt with history of intermittent vertigo for 10 years and now with same sxs for the past 5 days. No tinnitus, hearing loss, FND, and CTA head with no acute findings.  -Can also be component of orthostatics as pt with decreased PO intake in setting of odynophagia  -meclizine  -IVF  -vestibular therapy  -f/u neuro recs

## 2020-02-21 NOTE — H&P ADULT - ATTENDING COMMENTS
83 yo woman with PMH of HTN, HLD, DMII, and intermittent vertigo for the past 10 years who presents with sore throat and odynophagia for the past 7 days. Daughter at bedside and interprets for patient. Reviewed HPI and ROS.  Vitals reviewed and physically examined patient at bedside: Agree with resident's findings. Patient in no acute distress  Labs, imaging and EKG personally reviewed.  Odynophagia to solids and liquids. Per ENT eval no acute pathology in oropharynx. CT chest notable for "Diffusely prominent wall of the esophagus, which may be due to partial distention and/or esophagitis although underlying lesion/neoplasm cannot be excluded." Continue with PPI BID. NPO pending speech and swallow and GI evaluation  Vertigo: No acute pathology on CT head or CTA head/neck to suggest acute neuro event. Neuro consult reviewed and appreciated. Meclizine prn  Patient previously unknown to me and I was assigned to precept this case with housestaff resident, Dr. Winston. My involvement in this case consisted only of the initial history, physical and management plan. Primary day team to assume care in AM.

## 2020-02-21 NOTE — DISCHARGE NOTE PROVIDER - HOSPITAL COURSE
HPI per admitting resident:         83 YO F with Mhx of HTN, HLD, DMII, and intermittent vertigo for the past 10 years who presents with sore throat and odynophagia for the past 7 days. Pt was in her usual state of health when she started to have a nonproductive cough, sore throat, and odynopahia with pain upon eating solid food and then with liquids, as well as sensation of food stuck in throat. PO intake has decreased in setting of discomfort. Pt denies runny nose, fever, or myalgias. Additionally, she has been episodes of dizziness, a senseation of the room spinning, for the past 5 days that mostly occurs with change in position, including left to right while laying flat and going from a sitting to a standing position. She states this is similar to her prior episodes and was prescribed meclizine by her PCP a few days ago. She denies any head trauma, tinnitus, change in hearing or vision, weakness, nausea, vomiting, abd pain, or diarrhea, or constipation, Pt also denies any chest pain, palpitations, or shortness of breath.         Hospital course:         In the ED, vitals WNL.RVP was noted to be + for coronavirus. Patient endorsed vertigo that started Sunday, so received CTA of neck and CT head which were negative for acute stroke or bleed. Neuro was consulted who recommended continuing meclizine and outpatient vestibular therapy.  CT chest revealed diffusely prominent wall of esophageal wall which can be 2/2 esophagitis vs esophageal distention. Patient was started on nystatin swish and swallow as well as well as a PPI. ENT was consulted who performed a laryngoscopy and patient was noted to have a normal oropharyngx and no foreign object was noted. GI was consulted and patient was admitted to medicine.         On the medicine floors, patient was continued on PPI and nystatin swish and swallow. Per GI, given that patient is +coronavirus, upper endoscopy was deferred for outpatient on 2/26 for 3 PM at [facility]. Patient will be discharged with viscous lidocaine, nystatin swish and swallow, and hiblicans

## 2020-02-21 NOTE — PROGRESS NOTE ADULT - SUBJECTIVE AND OBJECTIVE BOX
Mike Silva MD  Internal Medicine, PGY-1  Beeper: [phone] ([facility])/ [id number] ([facility])     Subjective:    Patient is a 84y old  Female who presents with a chief complaint of Sore throat and odynophagia (2020 01:47)    Patient was seen and examined at bedside this AM. No acute overnight events. Denied fever, chills, nausea, vomiting, CP, palpitations, coughing, wheezing, SOB, and abdominal pain.      MEDICATIONS  (STANDING):  amLODIPine   Tablet 5 milliGRAM(s) Oral daily  atorvastatin 20 milliGRAM(s) Oral at bedtime  dextrose 5%. 1000 milliLiter(s) (50 mL/Hr) IV Continuous <Continuous>  dextrose 50% Injectable 12.5 Gram(s) IV Push once  dextrose 50% Injectable 25 Gram(s) IV Push once  dextrose 50% Injectable 25 Gram(s) IV Push once  insulin lispro (HumaLOG) corrective regimen sliding scale   SubCutaneous three times a day before meals  insulin lispro (HumaLOG) corrective regimen sliding scale   SubCutaneous at bedtime  lactated ringers. 1000 milliLiter(s) (75 mL/Hr) IV Continuous <Continuous>  lisinopril 2.5 milliGRAM(s) Oral daily  meclizine 25 milliGRAM(s) Oral two times a day  nystatin    Suspension 684272 Unit(s) Oral every 6 hours  pantoprazole    Tablet 40 milliGRAM(s) Oral before breakfast    MEDICATIONS  (PRN):  dextrose 40% Gel 15 Gram(s) Oral once PRN Blood Glucose LESS THAN 70 milliGRAM(s)/deciliter  glucagon  Injectable 1 milliGRAM(s) IntraMuscular once PRN Glucose LESS THAN 70 milligrams/deciliter      Objective:    Vitals: Vital Signs Last 24 Hrs  T(C): 36.4 (20 @ 05:40), Max: 37.1 (20 @ 17:42)  T(F): 97.5 (20 @ 05:40), Max: 98.8 (20 @ 17:42)  HR: 56 (20 @ 05:40) (51 - 90)  BP: 147/71 (20 @ 05:40) (123/52 - 154/72)  BP(mean): --  RR: 18 (20 @ 05:40) (16 - 18)  SpO2: 98% (20 @ 05:40) (98% - 100%)              I&O's Summary      PHYSICAL EXAM:  GENERAL: NAD, well-groomed, well-developed  HEAD:  Atraumatic, Normocephalic  EYES: EOMI, PERRLA, conjunctiva and sclera clear  ENMT: No tonsillar erythema, exudates, or enlargement; Moist mucous membranes, Good dentition, No lesions  NECK: Supple, No JVD, Normal thyroid  CHEST/LUNG: Clear to auscultation bilaterally; No rales, rhonchi, wheezing, or rubs  HEART: Regular rate and rhythm; No murmurs, rubs, or gallops  ABDOMEN: Soft, Nontender, Nondistended; Bowel sounds present  EXTREMITIES:  2+ Peripheral Pulses, No clubbing, cyanosis, or edema  LYMPH: No lymphadenopathy noted  SKIN: No rashes or lesions  NERVOUS SYSTEM:  Alert & Oriented X3, Good concentration; Motor Strength 5/5 B/L upper and lower extremities; DTRs 2+ intact and symmetric                                             LABS:      139  |  101  |  12  ----------------------------<  118<H>  4.2   |  23  |  0.60    Ca    9.8      2020 18:50    TPro  7.9  /  Alb  4.3  /  TBili  0.4  /  DBili  x   /  AST  20  /  ALT  12  /  AlkPhos  108        PT/INR - ( 2020 18:50 )   PT: 11.4 sec;   INR: 0.99 ratio         PTT - ( 2020 18:50 )  PTT:31.8 sec                Urinalysis Basic - ( 2020 18:49 )    Color: Colorless / Appearance: Clear / S.005 / pH: x  Gluc: x / Ketone: Negative  / Bili: Negative / Urobili: Negative   Blood: x / Protein: Negative / Nitrite: Negative   Leuk Esterase: Negative / RBC: 0 /hpf / WBC 0 /HPF   Sq Epi: x / Non Sq Epi: 0 /hpf / Bacteria: Negative                                  12.3   7.20  )-----------( 312      ( 2020 18:50 )             40.3     CAPILLARY BLOOD GLUCOSE            RECENT CULTURES:      TELEMETRY:    ECG:    TTE:    RADIOLOGY & ADDITIONAL TESTS:    Imaging Personally Reviewed:  [ ] YES  [ ] NO    Consultants involved in case:   Consultant(s) Notes Reviewed:  [ ] YES  [ ] NO:   Care Discussed with Consultants/Other Providers [ ] YES  [ ] NO Mike Silva MD  Internal Medicine, PGY-1  Beeper: [phone] ([facility])/ [id number] ([facility])     Subjective:    Patient is a 84y old  Female who presents with a chief complaint of Sore throat and odynophagia (2020 01:47)    Patient was seen and examined at bedside this AM; patient's family member was at bedside for translation. She was admitted overnight due to odynophagia and tested positive for coronavirus. This AM, endorsing left-sided neck pain and a rash under her eyes & neck. Denied regurgitation, epigastric pain, or history of GERD. Denied fever, chills, nausea, vomiting, CP, palpitations, coughing, wheezing, SOB, and abdominal pain.      MEDICATIONS  (STANDING):  amLODIPine   Tablet 5 milliGRAM(s) Oral daily  atorvastatin 20 milliGRAM(s) Oral at bedtime  dextrose 5%. 1000 milliLiter(s) (50 mL/Hr) IV Continuous <Continuous>  dextrose 50% Injectable 12.5 Gram(s) IV Push once  dextrose 50% Injectable 25 Gram(s) IV Push once  dextrose 50% Injectable 25 Gram(s) IV Push once  insulin lispro (HumaLOG) corrective regimen sliding scale   SubCutaneous three times a day before meals  insulin lispro (HumaLOG) corrective regimen sliding scale   SubCutaneous at bedtime  lactated ringers. 1000 milliLiter(s) (75 mL/Hr) IV Continuous <Continuous>  lisinopril 2.5 milliGRAM(s) Oral daily  meclizine 25 milliGRAM(s) Oral two times a day  nystatin    Suspension 807755 Unit(s) Oral every 6 hours  pantoprazole    Tablet 40 milliGRAM(s) Oral before breakfast    MEDICATIONS  (PRN):  dextrose 40% Gel 15 Gram(s) Oral once PRN Blood Glucose LESS THAN 70 milliGRAM(s)/deciliter  glucagon  Injectable 1 milliGRAM(s) IntraMuscular once PRN Glucose LESS THAN 70 milligrams/deciliter      Objective:    Vitals: Vital Signs Last 24 Hrs  T(C): 36.4 (20 @ 05:40), Max: 37.1 (20 @ 17:42)  T(F): 97.5 (20 @ 05:40), Max: 98.8 (20 @ 17:42)  HR: 56 (20 @ 05:40) (51 - 90)  BP: 147/71 (20 @ 05:40) (123/52 - 154/72)  RR: 18 (20 @ 05:40) (16 - 18)  SpO2: 98% (20 @ 05:40) (98% - 100%)                  PHYSICAL EXAM:  GENERAL: Elderly female resting comfortably in bed in NAD; patient's daughter at the bedside  HEAD:  Atraumatic, Normocephalic  EYES: EOMI, conjunctiva and sclera clear  ENMT: No tonsillar erythema, exudates, or enlargement; Moist mucous membranes, Good dentition, No lesions  NECK: Supple, Normal thyroid  CHEST/LUNG: Clear to auscultation bilaterally; No rales, rhonchi, wheezing, or rubs  HEART: Regular rate and rhythm; No murmurs, rubs, or gallops  ABDOMEN: Soft, Nontender, Nondistended; Bowel sounds present  EXTREMITIES:  2+ Peripheral Pulses, No clubbing, cyanosis, or edema  LYMPH: No lymphadenopathy noted  SKIN: No rashes or lesions  NERVOUS SYSTEM:  Alert & Oriented X3, Good concentration; Motor Strength 5/5 B/L upper and lower extremities; DTRs 2+ intact and symmetric                                               LABS:      139  |  101  |  12  ----------------------------<  118<H>  4.2   |  23  |  0.60    Ca    9.8      2020 18:50    TPro  7.9  /  Alb  4.3  /  TBili  0.4  /  DBili  x   /  AST  20  /  ALT  12  /  AlkPhos  108        PT/INR - ( 2020 18:50 )   PT: 11.4 sec;   INR: 0.99 ratio         PTT - ( 2020 18:50 )  PTT:31.8 sec                Urinalysis Basic - ( 2020 18:49 )    Color: Colorless / Appearance: Clear / S.005 / pH: x  Gluc: x / Ketone: Negative  / Bili: Negative / Urobili: Negative   Blood: x / Protein: Negative / Nitrite: Negative   Leuk Esterase: Negative / RBC: 0 /hpf / WBC 0 /HPF   Sq Epi: x / Non Sq Epi: 0 /hpf / Bacteria: Negative                                  12.3   7.20  )-----------( 312      ( 2020 18:50 )             40.3     CAPILLARY BLOOD GLUCOSE            RECENT CULTURES:      TELEMETRY:    ECG:    TTE:    RADIOLOGY & ADDITIONAL TESTS:    Imaging Personally Reviewed:  [ ] YES  [ ] NO    Consultants involved in case:   Consultant(s) Notes Reviewed:  [ ] YES  [ ] NO:   Care Discussed with Consultants/Other Providers [ ] YES  [ ] NO Mike Silva MD  Internal Medicine, PGY-1  Beeper: [phone] ([facility])/ [phone] ([facility])     Subjective:    Patient is a 84y old  Female who presents with a chief complaint of Sore throat and odynophagia (2020 01:47)    Patient was seen and examined at bedside this AM; patient's family member was at bedside for translation. She was admitted overnight due to odynophagia and tested positive for coronavirus. This AM, endorsing left-sided neck pain and a rash under her eyes & neck. Denied regurgitation, epigastric pain, or history of GERD. Denied fever, chills, nausea, vomiting, CP, palpitations, coughing, wheezing, SOB, and abdominal pain.      MEDICATIONS  (STANDING):  amLODIPine   Tablet 5 milliGRAM(s) Oral daily  atorvastatin 20 milliGRAM(s) Oral at bedtime  dextrose 5%. 1000 milliLiter(s) (50 mL/Hr) IV Continuous <Continuous>  dextrose 50% Injectable 12.5 Gram(s) IV Push once  dextrose 50% Injectable 25 Gram(s) IV Push once  dextrose 50% Injectable 25 Gram(s) IV Push once  insulin lispro (HumaLOG) corrective regimen sliding scale   SubCutaneous three times a day before meals  insulin lispro (HumaLOG) corrective regimen sliding scale   SubCutaneous at bedtime  lactated ringers. 1000 milliLiter(s) (75 mL/Hr) IV Continuous <Continuous>  lisinopril 2.5 milliGRAM(s) Oral daily  meclizine 25 milliGRAM(s) Oral two times a day  nystatin    Suspension 038699 Unit(s) Oral every 6 hours  pantoprazole    Tablet 40 milliGRAM(s) Oral before breakfast    MEDICATIONS  (PRN):  dextrose 40% Gel 15 Gram(s) Oral once PRN Blood Glucose LESS THAN 70 milliGRAM(s)/deciliter  glucagon  Injectable 1 milliGRAM(s) IntraMuscular once PRN Glucose LESS THAN 70 milligrams/deciliter      Objective:    Vitals: Vital Signs Last 24 Hrs  T(C): 36.4 (20 @ 05:40), Max: 37.1 (20 @ 17:42)  T(F): 97.5 (20 @ 05:40), Max: 98.8 (20 @ 17:42)  HR: 56 (20 @ 05:40) (51 - 90)  BP: 147/71 (20 @ 05:40) (123/52 - 154/72)  RR: 18 (20 @ 05:40) (16 - 18)  SpO2: 98% (20 @ 05:40) (98% - 100%)                  PHYSICAL EXAM:  GENERAL: Elderly female resting comfortably in bed in NAD; patient's daughter at the bedside  HEAD:  Atraumatic, Normocephalic  EYES: EOMI, conjunctiva and sclera clear  ENMT: No tonsillar erythema, exudates, or enlargement; Moist mucous membranes, Good dentition, No lesions  NECK: Supple, Normal thyroid  CHEST/LUNG: Clear to auscultation bilaterally; No rales, rhonchi, wheezing, or rubs  HEART: Regular rate and rhythm; No murmurs, rubs, or gallops  ABDOMEN: Soft, Nontender, Nondistended; Bowel sounds present  EXTREMITIES: No LE edema  LYMPH: No lymphadenopathy noted  SKIN: patient with pruritic, erythematous rash on the left side of her neck. Non-TTP, no drainage observed, non-vesicular  NERVOUS SYSTEM: Awake and alert, only speaks Malayalam                                            LABS:      139  |  101  |  12  ----------------------------<  118<H>  4.2   |  23  |  0.60    Ca    9.8      2020 18:50    TPro  7.9  /  Alb  4.3  /  TBili  0.4  /  DBili  x   /  AST  20  /  ALT  12  /  AlkPhos  108    PT/INR - ( 2020 18:50 )   PT: 11.4 sec;   INR: 0.99 ratio    PTT - ( 2020 18:50 )  PTT:31.8 sec                Urinalysis Basic - ( 2020 18:49 )  Color: Colorless / Appearance: Clear / S.005 / pH: x  Gluc: x / Ketone: Negative  / Bili: Negative / Urobili: Negative   Blood: x / Protein: Negative / Nitrite: Negative   Leuk Esterase: Negative / RBC: 0 /hpf / WBC 0 /HPF   Sq Epi: x / Non Sq Epi: 0 /hpf / Bacteria: Negative                          12.3   7.20  )-----------( 312      ( 2020 18:50 )             40.3

## 2020-02-21 NOTE — H&P ADULT - ASSESSMENT
85 YO F with Mhx of HTN, HLD, DMII, and intermittent vertigo for the past 10 years who presents with sore throat and odynophagia for the past 7 days.

## 2020-02-21 NOTE — H&P ADULT - NSHPREVIEWOFSYSTEMS_GEN_ALL_CORE
REVIEW OF SYSTEMS:    CONSTITUTIONAL: No weakness, fevers or chills  EYES/ENT: + vertigo and sore throat with odynophagia No visual or hearing  changes  NECK: No pain or stiffness  RESPIRATORY: + nonproductive cough No wheezing, hemoptysis; No shortness of breath  CARDIOVASCULAR: No chest pain or palpitations  GASTROINTESTINAL: No abdominal or epigastric pain. No nausea, vomiting, or hematemesis; No diarrhea or constipation. No melena or hematochezia.  GENITOURINARY: No dysuria, frequency or hematuria  NEUROLOGICAL: No numbness or weakness  SKIN: No itching, burning, rashes, or lesions   All other review of systems is negative unless indicated above.

## 2020-02-21 NOTE — H&P ADULT - NSHPLABSRESULTS_GEN_ALL_CORE
12.3   7  )-----------( 312      ( 2020 18:50 )             40.3           139  |  101  |  12  ----------------------------<  118<H>  4.2   |  23  |  0.60    Ca    9.8      2020 18:50    TPro  7.9  /  Alb  4.3  /  TBili  0.4  /  DBili  x   /  AST  20  /  ALT  12  /  AlkPhos  108                Urinalysis Basic - ( 2020 18:49 )    Color: Colorless / Appearance: Clear / S.005 / pH: x  Gluc: x / Ketone: Negative  / Bili: Negative / Urobili: Negative   Blood: x / Protein: Negative / Nitrite: Negative   Leuk Esterase: Negative / RBC: 0 /hpf / WBC 0 /HPF   Sq Epi: x / Non Sq Epi: 0 /hpf / Bacteria: Negative    PT/INR - ( 2020 18:50 )   PT: 11.4 sec;   INR: 0.99 ratio      PTT - ( 2020 18:50 )  PTT:31.8 sec    Lactate Trend  EXAM:  CT CHEST IC                        PROCEDURE DATE:  2020            INTERPRETATION:  CLINICAL INFORMATION: Difficulty swallowing, clinical concern for esophageal obstruction/mass.     COMPARISON: None.    PROCEDURE:   CT of the Chest was performed following the injection of intravenous contrast for concurrently performed CTA of the head and neck.  Please see separate report of concurrently performed CTA head/neck for details of contrast administration.  Sagittal and coronal reformats were performed.    FINDINGS: Artifact from the patient's arms and motion degrades images.    LUNGS AND AIRWAYS: Patent central airways. No focal consolidation. Subsegmental bibasilar atelectasis.    PLEURA: No pleural effusion or pneumothorax.    MEDIASTINUM AND VERONICA: Subcentimeter lymph nodes without lymphadenopathy.    VESSELS: Atherosclerotic change of the thoracic aorta, great vessel and coronary arteries. Ectatic ascending aorta (4.0 cm). Mildly enlarged main pulmonary artery (3.2 cm),which can be seen in pulmonary arterial hypertension.      HEART: Mildly enlarged heart. No pericardial effusion.     CHEST WALL AND LOWER NECK: Heterogeneous thyroid gland with small hypodense foci.    VISUALIZED UPPER ABDOMEN: Diffusely prominent wall of the esophagus containing debris. Partially distended stomach, limiting evaluation. Right hepatic calcified granuloma. Colon diverticulosis. Excluded contrast in the visualized proximal renal collecting system.    BONES: Osteopenia. Leftward curvature and degenerative changes of the spine. Small loose bodies in the region of the left subcoracoid bursa.    IMPRESSION:     Diffusely prominent wall of the esophagus, which may be due to partial distention and/or esophagitis although underlying lesion/neoplasm cannot be excluded. Recommend clinical correlation and follow-up upper endoscopy.    Heterogeneous thyroid gland, which may further characterized on a nonemergent ultrasound.    Additional findings as described. Personally reviewed labs and noted in detail below: no luekocytosis, no eosinophilia            12.3   7.20  )-----------( 312      ( 2020 18:50 )             40.3           139  |  101  |  12  ----------------------------<  118<H>  4.2   |  23  |  0.60    Ca    9.8      2020 18:50    TPro  7.9  /  Alb  4.3  /  TBili  0.4  /  DBili  x   /  AST  20  /  ALT  12  /  AlkPhos  108              Urinalysis Basic - ( 2020 18:49 )    Color: Colorless / Appearance: Clear / S.005 / pH: x  Gluc: x / Ketone: Negative  / Bili: Negative / Urobili: Negative   Blood: x / Protein: Negative / Nitrite: Negative   Leuk Esterase: Negative / RBC: 0 /hpf / WBC 0 /HPF   Sq Epi: x / Non Sq Epi: 0 /hpf / Bacteria: Negative    PT/INR - ( 2020 18:50 )   PT: 11.4 sec;   INR: 0.99 ratio      PTT - ( 2020 18:50 )  PTT:31.8 sec    Lactate Trend    EXAM:  CT CHEST IC                        PROCEDURE DATE:  2020          INTERPRETATION:  CLINICAL INFORMATION: Difficulty swallowing, clinical concern for esophageal obstruction/mass.     COMPARISON: None.    PROCEDURE:   CT of the Chest was performed following the injection of intravenous contrast for concurrently performed CTA of the head and neck.  Please see separate report of concurrently performed CTA head/neck for details of contrast administration.  Sagittal and coronal reformats were performed.    FINDINGS: Artifact from the patient's arms and motion degrades images.    LUNGS AND AIRWAYS: Patent central airways. No focal consolidation. Subsegmental bibasilar atelectasis.    PLEURA: No pleural effusion or pneumothorax.    MEDIASTINUM AND VERONICA: Subcentimeter lymph nodes without lymphadenopathy.    VESSELS: Atherosclerotic change of the thoracic aorta, great vessel and coronary arteries. Ectatic ascending aorta (4.0 cm). Mildly enlarged main pulmonary artery (3.2 cm),which can be seen in pulmonary arterial hypertension.      HEART: Mildly enlarged heart. No pericardial effusion.     CHEST WALL AND LOWER NECK: Heterogeneous thyroid gland with small hypodense foci.    VISUALIZED UPPER ABDOMEN: Diffusely prominent wall of the esophagus containing debris. Partially distended stomach, limiting evaluation. Right hepatic calcified granuloma. Colon diverticulosis. Excluded contrast in the visualized proximal renal collecting system.    BONES: Osteopenia. Leftward curvature and degenerative changes of the spine. Small loose bodies in the region of the left subcoracoid bursa.    IMPRESSION:     Diffusely prominent wall of the esophagus, which may be due to partial distention and/or esophagitis although underlying lesion/neoplasm cannot be excluded. Recommend clinical correlation and follow-up upper endoscopy.    Heterogeneous thyroid gland, which may further characterized on a nonemergent ultrasound.    Additional findings as described.    Personally reviewed EKG: Sinus Bill 59 bpm QTc 407 no ST segment changes

## 2020-02-21 NOTE — CONSULT NOTE ADULT - ATTENDING COMMENTS
83 yo F with PMH of DM, HTN, HLD presents to the ED with vertigo for past week. Translation provided by Pt's grandaumichi and in -law.  She has had room-spinning for last few days, even when lying down, comes and goes, worse with any head movement, accompanied by nausea and vomiting. She has hx of BPPV in the past which improved after Meclizine. This time it has not been improving. Per family she has had poorer appetite over the past few  months, not drinking fluids. She was found to be RSV positive on admission.    On exam she is awake, alert, at baseline she is not oriented to date, or current events, speech fluent, follows simple and complex commands. No nystagmus, ORIN, EOMI, no facial droop, No dysmetria on FNF, full strength throughout. unable to do gait exam as pt was being taken upstairs.     Likely dizziness in the setting of viral infection and dehydration. No findings on exam, but given vague sense of dizziness would rule out stroke.   MRI brain w/o  aspirin 81mg  PT eval.   IV fluid hydration, encourage PO intake.
85 yo F pmh HTN/HL, DM presenting with new onset dysphagia/odynophagia with CT scan showing 'esophageal thickening'.  Has + coronavirus on RSV.  Defer endo today give this.  Offered IP EGD next week vs. OP EGD middle/end of next week.  Patient/family desire the latter.  Treat empirically with Swish and Swallow, Viscous Lidocaine, and diet as tolerated.  Further care to be determined post EGD.

## 2020-02-21 NOTE — PROGRESS NOTE ADULT - PROBLEM SELECTOR PLAN 6
Transitions of Care Status:  1.  Name of PCP:Dr. Bunch [phone]  2.  PCP Contacted on Admission: [ ] Y    [ ] N    3.  PCP contacted at Discharge: [ ] Y    [ ] N    [ ] N/A  4.  Post-Discharge Appointment Date and Location:  5.  Summary of Handoff given to PCP: Transitions of Care Status:  1.  Name of PCP: Dr. Bunch [phone]  2.  PCP Contacted on Admission: [ ] Y    [ ] N    3.  PCP contacted at Discharge: [ ] Y    [ ] N    [ ] N/A  4.  Post-Discharge Appointment Date and Location:  5.  Summary of Handoff given to PCP:

## 2020-02-21 NOTE — DISCHARGE NOTE PROVIDER - CARE PROVIDER_API CALL
Francisco Garzon)  Gastroenterology; Internal Medicine  [address] [place] [address]  Phone: [phone]  Fax: [phone]  Follow Up Time:

## 2020-02-21 NOTE — DISCHARGE NOTE NURSING/CASE MANAGEMENT/SOCIAL WORK - PATIENT PORTAL LINK FT
You can access the FollowMyHealth Patient Portal offered by [facility] by registering at the following website: [email or web address]. By joining [employer]’s FollowMyHealth portal, you will also be able to view your health information using other applications (apps) compatible with our system.

## 2020-02-21 NOTE — DISCHARGE NOTE PROVIDER - NSDCMRMEDTOKEN_GEN_ALL_CORE_FT
amLODIPine 5 mg oral tablet: 1 tab(s) orally once a day  atorvastatin 20 mg oral tablet: 1 tab(s) orally once a day  chlorhexidine 0.12% mucous membrane liquid: 15 milliliter(s) orally 2 times a day   Glucophage 500 mg oral tablet: 1 tab(s) orally 2 times a day  Lidocaine Viscous 2% mucous membrane solution: Gargle every 3 hours  as needed for throat pain   lisinopril 2.5 mg oral tablet: 1 tab(s) orally once a day  nystatin 100,000 units/mL oral suspension: 5 milliliter(s) orally (swish and swallow) every 6 hours   pantoprazole 40 mg oral delayed release tablet: 1 tab(s) orally once a day (before a meal)

## 2020-02-21 NOTE — CONSULT NOTE ADULT - ASSESSMENT
Impression:  # Abnormal CT: prominent wall of esophagus, DDx includes malignancy, ulceration, food debris, lipoma, esophagitis. No concern for food bolus impaction. No hx of GERD, smoking or risk factors for cancer.  # Odynophagia: DDx viral/bacterial/fungal infection, pill induced, ulceration from stasis in setting of possible malignancy  # coronavirus: new diagnosis, likely explains cough and URI symptoms  # Hypertension  # T2DM    Recommendation:  - given active viral infection, will defer for procedure as outpatient  - discuss with granddaughter, plan for EGD as outpatient on Wednesday at 3pm in [facility] endoscopy suite  - trial PPI, viscous lidocaine, and nystatin oral swish and swallow empirically  - soft diet  - supportive care, from GI perspective no inpatient intervention planned    Pepe Michel  [phone]  [id number]  Please call/page on call fellow on weekends and weekdays after 5pm
Impression: Probably peripheral vertigo. She has prior history of similar symptoms. no focal neurological deficits to suggest acute ischemic stroke.    Plan:  -can c/w meclizine  -vestibular therapy
83 YO F with PMH of HTN, T2DM, HLD,  intermittent vertigo for the past 10 years who presents with sore throat and odynophagia for the past 7 days. Pt reports a sensation of food getting stuck in her throat. Per family at bedside, pt was tolerating regular diet at home. ENT was consulted for evaluation of Foreign body sensation in throat. Pt able to swallow secretions. Not in respiratory distress on evaluation. Fiberoptic Indirect Laryngoscopy showed, Post cricoid edema, pachydermia consistent with GERD. CT Chest showed, " Diffusely prominent wall of the esophagus, which may be due to partial distention and/or esophagitis although underlying lesion/neoplasm cannot be excluded. Recommend clinical correlation and follow-up upper endoscopy. Heterogeneous thyroid gland, which may further characterized on a nonemergent ultrasound"

## 2020-02-21 NOTE — H&P ADULT - PROBLEM SELECTOR PLAN 1
-CT chest with prominent wall of esophagus, distension Vs esophagitis.  -Given the patients complaints of odynophagia, will start on nystatin swish and swallow, although per radiology read, cannot r/o malignancy with esophageal distension.   -f/u RVP  -f/u ENT recs  -GI consult for endoscopy  -NPO for now until GI states whether they will perform endoscopy -CT chest with prominent wall of esophagus, distension Vs esophagitis.  -Given the patients complaints of odynophagia, will start on protonix and nystatin swish and swallow, although per radiology read, cannot r/o malignancy with esophageal distension.   -f/u RVP  -f/u ENT recs  -GI consult for endoscopy  -NPO except meds for now until GI states whether they will perform endoscopy

## 2020-02-21 NOTE — DISCHARGE NOTE PROVIDER - NSDCFUADDAPPT_GEN_ALL_CORE_FT
You have an upper endoscopy appointment on February 26th, 2020 at 3 PM in the endoscopy suite and [facility]

## 2020-02-21 NOTE — H&P ADULT - HISTORY OF PRESENT ILLNESS
83 YO F with Mhx of HTN, HLD, DMII, and intermittent vertigo for the past 10 years who presents with sore throat and odynophagia for the past 7 days. Pt was in her usual state of health when she started to have a nonproductive cough, sore throat, and odynopahia with pain upon eating solid food and then with liquids. Of note she has also had a cough for the past week, but denies runny nose, fever, or myalgias. Additionally, she has been episodes of dizziness, a senseation of the room spinning, for the past 5 days that mostly occurs with change in position, including left to right while laying flat and going from a sitting to a standing position. She states this is similar to her prior episodes and was prescribed meclizine by her PCP a few days ago. She denies any head trauma, tinnitus, change in hearing or vision, weakness, nausea, vomiting, abd pain, or diarrhea, or constipation, Pt also denies any chest pain, palpitations, or shortness of breath. 83 YO F with Mhx of HTN, HLD, DMII, and intermittent vertigo for the past 10 years who presents with sore throat and odynophagia for the past 7 days. Pt was in her usual state of health when she started to have a nonproductive cough, sore throat, and odynopahia with pain upon eating solid food and then with liquids, as well as sensation of food stuck in throat. Of note she has also had a cough for the past week, but denies runny nose, fever, or myalgias. Additionally, she has been episodes of dizziness, a senseation of the room spinning, for the past 5 days that mostly occurs with change in position, including left to right while laying flat and going from a sitting to a standing position. She states this is similar to her prior episodes and was prescribed meclizine by her PCP a few days ago. She denies any head trauma, tinnitus, change in hearing or vision, weakness, nausea, vomiting, abd pain, or diarrhea, or constipation, Pt also denies any chest pain, palpitations, or shortness of breath. 85 YO F with Mhx of HTN, HLD, DMII, and intermittent vertigo for the past 10 years who presents with sore throat and odynophagia for the past 7 days. Pt was in her usual state of health when she started to have a nonproductive cough, sore throat, and odynopahia with pain upon eating solid food and then with liquids, as well as sensation of food stuck in throat. Pt  denies runny nose, fever, or myalgias. Additionally, she has been episodes of dizziness, a senseation of the room spinning, for the past 5 days that mostly occurs with change in position, including left to right while laying flat and going from a sitting to a standing position. She states this is similar to her prior episodes and was prescribed meclizine by her PCP a few days ago. She denies any head trauma, tinnitus, change in hearing or vision, weakness, nausea, vomiting, abd pain, or diarrhea, or constipation, Pt also denies any chest pain, palpitations, or shortness of breath. 83 YO F with Mhx of HTN, HLD, DMII, and intermittent vertigo for the past 10 years who presents with sore throat and odynophagia for the past 7 days. Pt was in her usual state of health when she started to have a nonproductive cough, sore throat, and odynopahia with pain upon eating solid food and then with liquids, as well as sensation of food stuck in throat. PO intake has decreased in setting of discomfort. Pt denies runny nose, fever, or myalgias. Additionally, she has been episodes of dizziness, a senseation of the room spinning, for the past 5 days that mostly occurs with change in position, including left to right while laying flat and going from a sitting to a standing position. She states this is similar to her prior episodes and was prescribed meclizine by her PCP a few days ago. She denies any head trauma, tinnitus, change in hearing or vision, weakness, nausea, vomiting, abd pain, or diarrhea, or constipation, Pt also denies any chest pain, palpitations, or shortness of breath.

## 2020-02-21 NOTE — CONSULT NOTE ADULT - SUBJECTIVE AND OBJECTIVE BOX
CC:     HPI:         PAST MEDICAL & SURGICAL HISTORY:  Allergies.  No Known Allergies    Intolerances.  MEDICATIONS  (STANDING):  dextrose 5% + sodium chloride 0.45%. 1000 milliLiter(s) (125 mL/Hr) IV Continuous <Continuous>  MEDICATIONS  (PRN):     Social History: Denies smoking/ alcohol/drug use.   Family history: None.   ROS:   ENT: all negative except as noted in HPI   CV: denies palpitations.  Pulm: denies SOB, cough, hemoptysis.  : denies pertinent urinary symptoms, urgency.  Neuro: denies numbness/tingling, loss of sensation.  Psych: denies anxiety.  MS: denies muscle weakness, instability.  Heme: denies easy bruising or bleeding.  Endo: denies heat/cold intolerance, excessive sweating.  Vascular: denies LE edema.    Vital Signs Last 24 Hrs  T(C): 37.1 (20 Feb 2020 17:42), Max: 37.1 (20 Feb 2020 17:42)  T(F): 98.8 (20 Feb 2020 17:42), Max: 98.8 (20 Feb 2020 17:42)  HR: 63 (20 Feb 2020 21:45) (63 - 90)  BP: 132/67 (20 Feb 2020 21:45) (132/67 - 154/72)  BP(mean): --  RR: 16 (20 Feb 2020 21:45) (16 - 18)  SpO2: 100% (20 Feb 2020 21:45) (99% - 100%)                       12.3   7.20  )-----------( 312      ( 20 Feb 2020 18:50 )             40.3    02-20    139  |  101  |  12  ----------------------------<  118<H>  4.2   |  23  |  0.60    Ca    9.8      20 Feb 2020 18:50    TPro  7.9  /  Alb  4.3  /  TBili  0.4  /  DBili  x   /  AST  20  /  ALT  12  /  AlkPhos  108  02-20   PT/INR - ( 20 Feb 2020 18:50 )   PT: 11.4 sec;   INR: 0.99 ratio     PTT - ( 20 Feb 2020 18:50 )  PTT:31.8 sec    PHYSICAL EXAM:  Gen: NAD, On RA.   Skin: No rashes, bruises, or lesions.  Head: Normocephalic, Atraumatic.  Face: no edema, erythema, or fluctuance. Parotid glands soft without mass.  Eyes: no scleral injection.  Nose: Nares bilaterally patent, no discharge  Mouth: No Stridor / Drooling / Trismus.  Mucosa moist, tongue/uvula midline, oropharynx clear.  Neck: Flat, supple, no lymphadenopathy, trachea midline, no masses.  Lymphatic: No lymphadenopathy.  Resp: breathing easily, no stridor.  CV: no peripheral edema/cyanosis.  GI: nondistended .  Peripheral vascular: no JVD or edema.  Neuro: facial nerve intact, no facial droop.    Fiberoptic Indirect laryngoscopy:  (Scope #2 used)  Reason for Laryngoscopy: Foreign Body sensation in Throat/ Dysphagia.  Nasopharynx, oropharynx, and hypopharynx clear, no bleeding. Tongue base, posterior pharyngeal wall, vallecula, epiglottis, and subglottis appear normal. No erythema, edema, pooling of secretions, masses or lesions. Airway patent, no foreign body visualized. No glottic/supraglottic edema. True vocal cords, arytenoids, vestibular folds, ventricles, pyriform sinuses, and aryepiglottic folds appear normal bilaterally. Vocal cords mobile with good contact b/l.     IMAGING/ADDITIONAL STUDIES:   CT Chest: Diffusely prominent wall of the esophagus, which may be due to partial distention and/or esophagitis although underlying lesion/neoplasm cannot be excluded. Recommend clinical correlation and follow-up upper endoscopy.   Heterogeneous thyroid gland, which may further characterized on a nonemergent ultrasound.  CT brain: No acute intracranial hemorrhage, mass effect, midline shift.   CTA neck: No hemodynamically significant stenosis by NASCET criteria. No vascular dissection.  CTA brain: No major vessel occlusion or proximal stenosis. No aneurysm or vascular malformation. CC: Foreign Body sensation in Throat/ Dysphagia.    HPI: 85 YO F with PMH of HTN, T2DM, HLD,  intermittent vertigo for the past 10 years who presents with sore throat and odynophagia for the past 7 days. Pt reports a sensation of food getting stuck in her throat. Per family at bedside, pt was tolerating regular diet at home. No h/o dysphagia or doesn't see any GI specialist outpatient. Additionally, she has been episodes of dizziness, a sensation of the room spinning, for the past 5 days that mostly occurs with change in position, including left to right while laying flat and going from a sitting to a standing position. She states this is similar to her prior episodes and was prescribed meclizine by her PCP a few days ago. ENT was consulted for evaluation of Foreign body sensation in throat. Pt able to swallow secretions. Not in respiratory distress on evaluation Denies URI, cough, fever/chills, Tinnitus/Dizziness/ Vertigo/ Hearing loss.  Abdominal Pain/N/V, Hoarseness, CP/SOB/ Palpitations Diaphoresis, HA/Dizziness/ Blurry vision/ syncope,  recent travel/sick contacts.     PAST MEDICAL & SURGICAL HISTORY:  Allergies.  No Known Allergies    Intolerances.  MEDICATIONS  (STANDING):  dextrose 5% + sodium chloride 0.45%. 1000 milliLiter(s) (125 mL/Hr) IV Continuous <Continuous>  MEDICATIONS  (PRN):     Social History: Denies smoking/ alcohol/drug use.   Family history: None.   ROS:   ENT: all negative except as noted in HPI   CV: denies palpitations.  Pulm: denies SOB, cough, hemoptysis.  : denies pertinent urinary symptoms, urgency.  Neuro: denies numbness/tingling, loss of sensation.  Psych: denies anxiety.  MS: denies muscle weakness, instability.  Heme: denies easy bruising or bleeding.  Endo: denies heat/cold intolerance, excessive sweating.  Vascular: denies LE edema.    Vital Signs Last 24 Hrs  T(C): 37.1 (20 Feb 2020 17:42), Max: 37.1 (20 Feb 2020 17:42)  T(F): 98.8 (20 Feb 2020 17:42), Max: 98.8 (20 Feb 2020 17:42)  HR: 63 (20 Feb 2020 21:45) (63 - 90)  BP: 132/67 (20 Feb 2020 21:45) (132/67 - 154/72)  BP(mean): --  RR: 16 (20 Feb 2020 21:45) (16 - 18)  SpO2: 100% (20 Feb 2020 21:45) (99% - 100%)                       12.3   7.20  )-----------( 312      ( 20 Feb 2020 18:50 )             40.3    02-20    139  |  101  |  12  ----------------------------<  118<H>  4.2   |  23  |  0.60    Ca    9.8      20 Feb 2020 18:50    TPro  7.9  /  Alb  4.3  /  TBili  0.4  /  DBili  x   /  AST  20  /  ALT  12  /  AlkPhos  108  02-20   PT/INR - ( 20 Feb 2020 18:50 )   PT: 11.4 sec;   INR: 0.99 ratio     PTT - ( 20 Feb 2020 18:50 )  PTT:31.8 sec    PHYSICAL EXAM:  Gen: NAD, On RA.   Skin: No rashes, bruises, or lesions.  Head: Normocephalic, Atraumatic.  Face: no edema, erythema, or fluctuance. Parotid glands soft without mass.  Eyes: no scleral injection.  Nose: Nares bilaterally patent, no discharge  Mouth: No Stridor / Drooling / Trismus.  Mucosa moist, tongue/uvula midline, oropharynx clear.  Neck: Flat, supple, no lymphadenopathy, trachea midline, no masses.  Lymphatic: No lymphadenopathy.  Resp: breathing easily, no stridor.  CV: no peripheral edema/cyanosis.  GI: nondistended .  Peripheral vascular: no JVD or edema.  Neuro: facial nerve intact, no facial droop.    Fiberoptic Indirect laryngoscopy:  (Scope #2 used)  Reason for Laryngoscopy: Foreign Body sensation in Throat/ Dysphagia.  Post cricoid edema, pachydermia consistent with GERD.   Nasopharynx, oropharynx, and hypopharynx clear, no bleeding. Tongue base, posterior pharyngeal wall, vallecula, epiglottis, and subglottis appear normal. No erythema, edema, pooling of secretions, masses or lesions. Airway patent, no foreign body visualized. No glottic/supraglottic edema. True vocal cords, arytenoids, vestibular folds, ventricles, pyriform sinuses, and aryepiglottic folds appear normal bilaterally. Vocal cords mobile with good contact b/l.     IMAGING/ADDITIONAL STUDIES:   CT Chest: Diffusely prominent wall of the esophagus, which may be due to partial distention and/or esophagitis although underlying lesion/neoplasm cannot be excluded. Recommend clinical correlation and follow-up upper endoscopy.   Heterogeneous thyroid gland, which may further characterized on a nonemergent ultrasound.  CT brain: No acute intracranial hemorrhage, mass effect, midline shift.   CTA neck: No hemodynamically significant stenosis by NASCET criteria. No vascular dissection.  CTA brain: No major vessel occlusion or proximal stenosis. No aneurysm or vascular malformation. CC: Foreign Body sensation in Throat/ Dysphagia.    HPI: 83 YO F with PMH of HTN, T2DM, HLD,  intermittent vertigo for the past 10 years who presents with sore throat and odynophagia for the past 7 days. Pt reports a sensation of food getting stuck in her throat. Per family at bedside, pt was tolerating regular diet at home. No h/o dysphagia or doesn't see any GI specialist outpatient. Additionally, she has been episodes of dizziness, a sensation of the room spinning, for the past 5 days that mostly occurs with change in position, including left to right while laying flat and going from a sitting to a standing position. She states this is similar to her prior episodes and was prescribed meclizine by her PCP a few days ago. ENT was consulted for evaluation of Foreign body sensation in throat. Pt able to swallow secretions. Not in respiratory distress on evaluation Denies URI, cough, fever/chills, Tinnitus/ Hearing loss.  Abdominal Pain/N/V, Hoarseness, CP/SOB/ Palpitations Diaphoresis, HA/Dizziness/ Blurry vision/ syncope,  recent travel/sick contacts.     PAST MEDICAL & SURGICAL HISTORY:  Allergies.  No Known Allergies    Intolerances.  MEDICATIONS  (STANDING):  dextrose 5% + sodium chloride 0.45%. 1000 milliLiter(s) (125 mL/Hr) IV Continuous <Continuous>  MEDICATIONS  (PRN):     Social History: Denies smoking/ alcohol/drug use.   Family history: None.   ROS:   ENT: all negative except as noted in HPI   CV: denies palpitations.  Pulm: denies SOB, cough, hemoptysis.  : denies pertinent urinary symptoms, urgency.  Neuro: denies numbness/tingling, loss of sensation.  Psych: denies anxiety.  MS: denies muscle weakness, instability.  Heme: denies easy bruising or bleeding.  Endo: denies heat/cold intolerance, excessive sweating.  Vascular: denies LE edema.    Vital Signs Last 24 Hrs  T(C): 37.1 (20 Feb 2020 17:42), Max: 37.1 (20 Feb 2020 17:42)  T(F): 98.8 (20 Feb 2020 17:42), Max: 98.8 (20 Feb 2020 17:42)  HR: 63 (20 Feb 2020 21:45) (63 - 90)  BP: 132/67 (20 Feb 2020 21:45) (132/67 - 154/72)  BP(mean): --  RR: 16 (20 Feb 2020 21:45) (16 - 18)  SpO2: 100% (20 Feb 2020 21:45) (99% - 100%)                       12.3   7.20  )-----------( 312      ( 20 Feb 2020 18:50 )             40.3    02-20    139  |  101  |  12  ----------------------------<  118<H>  4.2   |  23  |  0.60    Ca    9.8      20 Feb 2020 18:50    TPro  7.9  /  Alb  4.3  /  TBili  0.4  /  DBili  x   /  AST  20  /  ALT  12  /  AlkPhos  108  02-20   PT/INR - ( 20 Feb 2020 18:50 )   PT: 11.4 sec;   INR: 0.99 ratio     PTT - ( 20 Feb 2020 18:50 )  PTT:31.8 sec    PHYSICAL EXAM:  Gen: NAD, On RA.   Skin: No rashes, bruises, or lesions.  Head: Normocephalic, Atraumatic.  Face: no edema, erythema, or fluctuance. Parotid glands soft without mass.  Eyes: no scleral injection.  Nose: Nares bilaterally patent, no discharge  Mouth: No Stridor / Drooling / Trismus.  Mucosa moist, tongue/uvula midline, oropharynx clear.  Neck: Flat, supple, no lymphadenopathy, trachea midline, no masses.  Lymphatic: No lymphadenopathy.  Resp: breathing easily, no stridor.  CV: no peripheral edema/cyanosis.  GI: nondistended .  Peripheral vascular: no JVD or edema.  Neuro: facial nerve intact, no facial droop.    Fiberoptic Indirect laryngoscopy:  (Scope #2 used)  Reason for Laryngoscopy: Foreign Body sensation in Throat/ Dysphagia.  Post cricoid edema, pachydermia consistent with GERD. Airway patent, no foreign body visualized.  Nasopharynx, oropharynx, and hypopharynx clear, no bleeding. Tongue base, posterior pharyngeal wall, vallecula, epiglottis, and subglottis appear normal. No erythema, edema, pooling of secretions, masses or lesions.  No glottic/supraglottic edema. True vocal cords, arytenoids, vestibular folds, ventricles, pyriform sinuses, and aryepiglottic folds appear normal bilaterally. Vocal cords mobile with good contact b/l.     IMAGING/ADDITIONAL STUDIES:   CT Chest: Diffusely prominent wall of the esophagus, which may be due to partial distention and/or esophagitis although underlying lesion/neoplasm cannot be excluded. Recommend clinical correlation and follow-up upper endoscopy.   Heterogeneous thyroid gland, which may further characterized on a nonemergent ultrasound.  CT brain: No acute intracranial hemorrhage, mass effect, midline shift.   CTA neck: No hemodynamically significant stenosis by NASCET criteria. No vascular dissection.  CTA brain: No major vessel occlusion or proximal stenosis. No aneurysm or vascular malformation.

## 2020-02-21 NOTE — DISCHARGE NOTE NURSING/CASE MANAGEMENT/SOCIAL WORK - NSDCFUADDAPPT_GEN_ALL_CORE_FT
You have an upper endoscopy appointment on February 26th, 2020 at 3 PM in the endoscopy suite and [facility]

## 2020-02-21 NOTE — ED ADULT NURSE REASSESSMENT NOTE - NS ED NURSE REASSESS COMMENT FT1
Report receievd from Anushka RN, Pt calm and cooperative, Pt admitted to medicine, family at bedside.  Pt given fluids.

## 2020-02-24 DIAGNOSIS — R13.10 DYSPHAGIA, UNSPECIFIED: ICD-10-CM

## 2020-02-24 PROBLEM — Z00.00 ENCOUNTER FOR PREVENTIVE HEALTH EXAMINATION: Status: ACTIVE | Noted: 2020-02-24

## 2020-02-24 PROBLEM — E11.9 TYPE 2 DIABETES MELLITUS WITHOUT COMPLICATIONS: Chronic | Status: ACTIVE | Noted: 2020-02-21

## 2020-02-24 PROBLEM — R42 DIZZINESS AND GIDDINESS: Chronic | Status: ACTIVE | Noted: 2020-02-21

## 2020-02-24 PROBLEM — E78.5 HYPERLIPIDEMIA, UNSPECIFIED: Chronic | Status: ACTIVE | Noted: 2020-02-21

## 2020-02-24 PROBLEM — I10 ESSENTIAL (PRIMARY) HYPERTENSION: Chronic | Status: ACTIVE | Noted: 2020-02-21

## 2020-02-26 ENCOUNTER — APPOINTMENT (OUTPATIENT)
Dept: GASTROENTEROLOGY | Facility: HOSPITAL | Age: 85
End: 2020-02-26

## 2020-02-26 ENCOUNTER — OUTPATIENT (OUTPATIENT)
Dept: OUTPATIENT SERVICES | Facility: HOSPITAL | Age: 85
LOS: 1 days | End: 2020-02-26
Payer: MEDICARE

## 2020-02-26 ENCOUNTER — RESULT REVIEW (OUTPATIENT)
Age: 85
End: 2020-02-26

## 2020-02-26 DIAGNOSIS — R13.10 DYSPHAGIA, UNSPECIFIED: ICD-10-CM

## 2020-02-26 PROCEDURE — 43239 EGD BIOPSY SINGLE/MULTIPLE: CPT

## 2020-02-26 PROCEDURE — 82962 GLUCOSE BLOOD TEST: CPT

## 2020-02-26 PROCEDURE — 88305 TISSUE EXAM BY PATHOLOGIST: CPT

## 2020-02-26 PROCEDURE — 88305 TISSUE EXAM BY PATHOLOGIST: CPT | Mod: 26

## 2020-03-18 ENCOUNTER — RX CHANGE (OUTPATIENT)
Age: 85
End: 2020-03-18

## 2020-03-18 DIAGNOSIS — K20.9 ESOPHAGITIS, UNSPECIFIED: ICD-10-CM

## 2020-04-17 RX ORDER — PANTOPRAZOLE 40 MG/1
40 TABLET, DELAYED RELEASE ORAL DAILY
Qty: 30 | Refills: 0 | Status: ACTIVE | COMMUNITY
Start: 2020-03-18 | End: 1900-01-01

## 2020-05-19 ENCOUNTER — APPOINTMENT (OUTPATIENT)
Dept: GASTROENTEROLOGY | Facility: CLINIC | Age: 85
End: 2020-05-19

## 2024-06-04 ENCOUNTER — EMERGENCY (EMERGENCY)
Facility: HOSPITAL | Age: 89
LOS: 0 days | Discharge: HOME | End: 2024-06-04
Attending: EMERGENCY MEDICINE
Payer: MEDICARE

## 2024-06-04 VITALS
OXYGEN SATURATION: 99 % | DIASTOLIC BLOOD PRESSURE: 46 MMHG | HEART RATE: 73 BPM | TEMPERATURE: 99 F | RESPIRATION RATE: 16 BRPM | SYSTOLIC BLOOD PRESSURE: 140 MMHG

## 2024-06-04 VITALS
SYSTOLIC BLOOD PRESSURE: 143 MMHG | DIASTOLIC BLOOD PRESSURE: 62 MMHG | HEART RATE: 66 BPM | TEMPERATURE: 98 F | OXYGEN SATURATION: 100 % | RESPIRATION RATE: 16 BRPM

## 2024-06-04 DIAGNOSIS — M79.89 OTHER SPECIFIED SOFT TISSUE DISORDERS: ICD-10-CM

## 2024-06-04 DIAGNOSIS — R21 RASH AND OTHER NONSPECIFIC SKIN ERUPTION: ICD-10-CM

## 2024-06-04 DIAGNOSIS — L03.115 CELLULITIS OF RIGHT LOWER LIMB: ICD-10-CM

## 2024-06-04 LAB
ANION GAP SERPL CALC-SCNC: 2 MMOL/L — LOW (ref 5–17)
ANISOCYTOSIS BLD QL: SLIGHT — SIGNIFICANT CHANGE UP
BASOPHILS # BLD AUTO: 0.05 K/UL — SIGNIFICANT CHANGE UP (ref 0–0.2)
BASOPHILS NFR BLD AUTO: 0.6 % — SIGNIFICANT CHANGE UP (ref 0–2)
BUN SERPL-MCNC: 19 MG/DL — SIGNIFICANT CHANGE UP (ref 7–23)
CALCIUM SERPL-MCNC: 9.7 MG/DL — SIGNIFICANT CHANGE UP (ref 8.5–10.1)
CHLORIDE SERPL-SCNC: 110 MMOL/L — HIGH (ref 96–108)
CO2 SERPL-SCNC: 30 MMOL/L — SIGNIFICANT CHANGE UP (ref 22–31)
CREAT SERPL-MCNC: 0.81 MG/DL — SIGNIFICANT CHANGE UP (ref 0.5–1.3)
EGFR: 70 ML/MIN/1.73M2 — SIGNIFICANT CHANGE UP
EOSINOPHIL # BLD AUTO: 0.15 K/UL — SIGNIFICANT CHANGE UP (ref 0–0.5)
EOSINOPHIL NFR BLD AUTO: 1.7 % — SIGNIFICANT CHANGE UP (ref 0–6)
GLUCOSE SERPL-MCNC: 104 MG/DL — HIGH (ref 70–99)
HCT VFR BLD CALC: 32.4 % — LOW (ref 34.5–45)
HGB BLD-MCNC: 10.3 G/DL — LOW (ref 11.5–15.5)
HYPOCHROMIA BLD QL: SLIGHT — SIGNIFICANT CHANGE UP
IMM GRANULOCYTES NFR BLD AUTO: 0.5 % — SIGNIFICANT CHANGE UP (ref 0–0.9)
LYMPHOCYTES # BLD AUTO: 2.76 K/UL — SIGNIFICANT CHANGE UP (ref 1–3.3)
LYMPHOCYTES # BLD AUTO: 31.9 % — SIGNIFICANT CHANGE UP (ref 13–44)
MANUAL SMEAR VERIFICATION: SIGNIFICANT CHANGE UP
MCHC RBC-ENTMCNC: 26.9 PG — LOW (ref 27–34)
MCHC RBC-ENTMCNC: 31.8 GM/DL — LOW (ref 32–36)
MCV RBC AUTO: 84.6 FL — SIGNIFICANT CHANGE UP (ref 80–100)
MONOCYTES # BLD AUTO: 0.61 K/UL — SIGNIFICANT CHANGE UP (ref 0–0.9)
MONOCYTES NFR BLD AUTO: 7 % — SIGNIFICANT CHANGE UP (ref 2–14)
NEUTROPHILS # BLD AUTO: 5.05 K/UL — SIGNIFICANT CHANGE UP (ref 1.8–7.4)
NEUTROPHILS NFR BLD AUTO: 58.3 % — SIGNIFICANT CHANGE UP (ref 43–77)
PLAT MORPH BLD: NORMAL — SIGNIFICANT CHANGE UP
PLATELET # BLD AUTO: 249 K/UL — SIGNIFICANT CHANGE UP (ref 150–400)
PLATELET COUNT - ESTIMATE: NORMAL — SIGNIFICANT CHANGE UP
POIKILOCYTOSIS BLD QL AUTO: SLIGHT — SIGNIFICANT CHANGE UP
POTASSIUM SERPL-MCNC: 4.2 MMOL/L — SIGNIFICANT CHANGE UP (ref 3.5–5.3)
POTASSIUM SERPL-SCNC: 4.2 MMOL/L — SIGNIFICANT CHANGE UP (ref 3.5–5.3)
RBC # BLD: 3.83 M/UL — SIGNIFICANT CHANGE UP (ref 3.8–5.2)
RBC # FLD: 13.3 % — SIGNIFICANT CHANGE UP (ref 10.3–14.5)
RBC BLD AUTO: ABNORMAL
SODIUM SERPL-SCNC: 142 MMOL/L — SIGNIFICANT CHANGE UP (ref 135–145)
WBC # BLD: 8.66 K/UL — SIGNIFICANT CHANGE UP (ref 3.8–10.5)
WBC # FLD AUTO: 8.66 K/UL — SIGNIFICANT CHANGE UP (ref 3.8–10.5)

## 2024-06-04 PROCEDURE — 99285 EMERGENCY DEPT VISIT HI MDM: CPT | Mod: 25

## 2024-06-04 PROCEDURE — 73630 X-RAY EXAM OF FOOT: CPT | Mod: 26,RT

## 2024-06-04 PROCEDURE — 85025 COMPLETE CBC W/AUTO DIFF WBC: CPT

## 2024-06-04 PROCEDURE — 99284 EMERGENCY DEPT VISIT MOD MDM: CPT

## 2024-06-04 PROCEDURE — 80048 BASIC METABOLIC PNL TOTAL CA: CPT

## 2024-06-04 PROCEDURE — 93971 EXTREMITY STUDY: CPT | Mod: 26,RT

## 2024-06-04 PROCEDURE — 93971 EXTREMITY STUDY: CPT | Mod: RT

## 2024-06-04 PROCEDURE — 73630 X-RAY EXAM OF FOOT: CPT | Mod: RT

## 2024-06-04 PROCEDURE — 36415 COLL VENOUS BLD VENIPUNCTURE: CPT

## 2024-06-04 RX ORDER — CEPHALEXIN 500 MG
500 CAPSULE ORAL ONCE
Refills: 0 | Status: COMPLETED | OUTPATIENT
Start: 2024-06-04 | End: 2024-06-04

## 2024-06-04 RX ORDER — CEPHALEXIN 500 MG
1 CAPSULE ORAL
Qty: 20 | Refills: 0
Start: 2024-06-04 | End: 2024-06-08

## 2024-06-04 RX ADMIN — Medication 500 MILLIGRAM(S): at 21:00

## 2024-06-04 NOTE — ED PROVIDER NOTE - OBJECTIVE STATEMENT
Patient presents to ED with right dorsum foot erythema increased in temperature 2 days no right leg swelling no trauma no fevers no chest pain shortness of breath no abdominal pain ambulating no acute distress no history of DVT or PE

## 2024-06-04 NOTE — ED ADULT TRIAGE NOTE - CHIEF COMPLAINT QUOTE
Pt presents to the ED c/o RLE swelling since yesterday; erythema, warmth, and discoloration today. Denies fevers. Pt having difficulty ambulating due to pain. Pt has not taken any medication for symptoms. PMH of DM, HTN, and HLD.

## 2024-06-04 NOTE — ED PROVIDER NOTE - PATIENT PORTAL LINK FT
You can access the FollowMyHealth Patient Portal offered by [facility] by registering at the following website: [email or web address]. By joining [facility] [employer]’s FollowMyHealth portal, you will also be able to view your health information using other applications (apps) compatible with our system.

## 2024-06-04 NOTE — ED PROVIDER NOTE - PROGRESS NOTE DETAILS
Patient with localized erythema to dorsum of right foot no crepitus neurovascular intact 2+ DP PT pulses normal range of motion right lower extremity all digits no right leg pain or right leg swelling advised I think she needs x-ray p.o. antibiotics family with concern requesting blood work ultrasound I am happy to order blood work and ultrasound all questions answered to patient's granddaughter at bedside and other family member I spoke with on the phone updated daughter negative ultrasound normal x-ray will do  Keflex  4 times a day x 5 days family in the medical field aware of what to come back for including spreading erythema fever right leg swelling family updated on results of blood work x-ray ultrasound happy with plan of care advised can return to ED at anytime with other concerns

## 2024-06-04 NOTE — ED PROVIDER NOTE - NSFOLLOWUPINSTRUCTIONS_ED_ALL_ED_FT
Patient Name: HIRA PIPER  Caregiver: Oscar Peterson  Cellulitis, Adult       Cellulitis is a skin infection. The infected area is usually warm, red, swollen, and tender. This condition occurs most often in the arms and lower legs. The infection can travel to the muscles, blood, and underlying tissue and become serious. It is very important to get treated for this condition.    What are the causes?  Cellulitis is caused by bacteria. The bacteria enter through a break in the skin, such as a cut, burn, insect bite, open sore, or crack.    What increases the risk?  This condition is more likely to occur in people who:    Have a weak body defense system (immune system).  Have open wounds on the skin, such as cuts, burns, bites, and scrapes. Bacteria can enter the body through these open wounds.  Are older than 60 years of age.  Have diabetes.  Have a type of long-lasting (chronic) liver disease (cirrhosis) or kidney disease.  Are obese.  Have a skin condition such as:    Itchy rash (eczema).  Slow movement of blood in the veins (venous stasis).  Fluid buildup below the skin (edema).  Have had radiation therapy.  Use IV drugs.    What are the signs or symptoms?  Symptoms of this condition include:    Redness, streaking, or spotting on the skin.  Swollen area of the skin.  Tenderness or pain when an area of the skin is touched.  Warm skin.  A fever.  Chills.  Blisters.    How is this diagnosed?  This condition is diagnosed based on a medical history and physical exam. You may also have tests, including:    Blood tests.  Imaging tests.    How is this treated?  Treatment for this condition may include:    Medicines, such as antibiotic medicines or medicines to treat allergies (antihistamines).  Supportive care, such as rest and application of cold or warm cloths (compresses) to the skin.  Hospital care, if the condition is severe.    The infection usually starts to get better within 1–2 days of treatment.    Follow these instructions at home:         Medicines    Take over-the-counter and prescription medicines only as told by your health care provider.  If you were prescribed an antibiotic medicine, take it as told by your health care provider. Do not stop taking the antibiotic even if you start to feel better.        General instructions    Drink enough fluid to keep your urine pale yellow.  Do not touch or rub the infected area.  Raise (elevate) the infected area above the level of your heart while you are sitting or lying down.  Apply warm or cold compresses to the affected area as told by your health care provider.  Keep all follow-up visits as told by your health care provider. This is important. These visits let your health care provider make sure a more serious infection is not developing.    Contact a health care provider if:  You have a fever.  Your symptoms do not begin to improve within 1–2 days of starting treatment.  Your bone or joint underneath the infected area becomes painful after the skin has healed.  Your infection returns in the same area or another area.  You notice a swollen bump in the infected area.  You develop new symptoms.  You have a general ill feeling (malaise) with muscle aches and pains.    Get help right away if:  Your symptoms get worse.  You feel very sleepy.  You develop vomiting or diarrhea that persists.  You notice red streaks coming from the infected area.  Your red area gets larger or turns dark in color.    These symptoms may represent a serious problem that is an emergency. Do not wait to see if the symptoms will go away. Get medical help right away. Call your local emergency services (911 in the [place] Do not drive yourself to the hospital.    Summary  Cellulitis is a skin infection. This condition occurs most often in the arms and lower legs.  Treatment for this condition may include medicines, such as antibiotic medicines or antihistamines.  Take over-the-counter and prescription medicines only as told by your health care provider. If you were prescribed an antibiotic medicine, do not stop taking the antibiotic even if you start to feel better.  Contact a health care provider if your symptoms do not begin to improve within 1–2 days of starting treatment or your symptoms get worse.  Keep all follow-up visits as told by your health care provider. This is important. These visits let your health care provider make sure that a more serious infection is not developing.    ADDITIONAL NOTES AND INSTRUCTIONS    Please follow up with your Primary MD in 24-48 hr.  Seek immediate medical care for any new/worsening signs or symptoms.     Document Released: 9/27/2006 Document Revised: 12/28/2020 Document Reviewed: 5/9/2019  [employer] Patient Education ©2019 [employer] This information is not intended to replace advice given to you by your health care provider. Make sure you discuss any questions you have with your health care provider.

## 2024-06-04 NOTE — ED ADULT NURSE NOTE - TEMPLATE
Pt given discharge instructions, patient education, 0 prescriptions and follow up information. Pt verbalizes understanding. All questions answered. Pt discharged to home in private vehicle, ambulatory. Pt A&Ox4, RA, pain controlled. General

## 2024-06-04 NOTE — ED PROVIDER NOTE - SKIN, MLM
+  mild erythema dorsum foot increased in temperature 2+ DP and PT pulses normal range of motion right lower extremity all digits no right leg swelling

## 2024-06-04 NOTE — ED ADULT NURSE NOTE - OBJECTIVE STATEMENT
Patient presents to ED c/o of swelling to RLE. AOX3, patient is accompanied by granddaughter who states that swelling ot RLE started yesterday. Extremity is warm to touch. Denies any pain, n/v/d, sob or chest pain. Hx of HTN, DM and HLD. MD at bedside, orders placed for xray and PO antibiotics. comfort and safety maintained.

## 2024-06-04 NOTE — ED ADULT NURSE NOTE - NSFALLRISKINTERV_ED_ALL_ED

## 2024-06-04 NOTE — ED PROVIDER NOTE - CLINICAL SUMMARY MEDICAL DECISION MAKING FREE TEXT BOX
updated daughter negative ultrasound normal x-ray will do  Keflex  4 times a day x 5 days family in the medical field aware of what to come back for including spreading erythema fever right leg swelling family updated on results of blood work x-ray ultrasound happy with plan of care advised can return to ED at anytime with other concerns

## 2025-03-28 ENCOUNTER — INPATIENT (INPATIENT)
Facility: HOSPITAL | Age: 89
LOS: 1 days | Discharge: HOME | End: 2025-03-30
Attending: HOSPITALIST | Admitting: HOSPITALIST
Payer: MEDICARE

## 2025-03-28 VITALS
HEART RATE: 101 BPM | OXYGEN SATURATION: 97 % | SYSTOLIC BLOOD PRESSURE: 145 MMHG | HEIGHT: 62 IN | RESPIRATION RATE: 16 BRPM | TEMPERATURE: 98 F | WEIGHT: 119.93 LBS | DIASTOLIC BLOOD PRESSURE: 70 MMHG

## 2025-03-28 DIAGNOSIS — R41.82 ALTERED MENTAL STATUS, UNSPECIFIED: ICD-10-CM

## 2025-03-28 LAB
ALBUMIN SERPL ELPH-MCNC: 4 G/DL — SIGNIFICANT CHANGE UP (ref 3.3–5)
ALP SERPL-CCNC: 77 U/L — SIGNIFICANT CHANGE UP (ref 40–120)
ALT FLD-CCNC: 9 U/L — SIGNIFICANT CHANGE UP (ref 4–33)
ANION GAP SERPL CALC-SCNC: 14 MMOL/L — SIGNIFICANT CHANGE UP (ref 7–14)
APPEARANCE UR: CLEAR — SIGNIFICANT CHANGE UP
APPEARANCE UR: CLEAR — SIGNIFICANT CHANGE UP
AST SERPL-CCNC: 29 U/L — SIGNIFICANT CHANGE UP (ref 4–32)
BACTERIA # UR AUTO: NEGATIVE /HPF — SIGNIFICANT CHANGE UP
BASOPHILS # BLD AUTO: 0.04 K/UL — SIGNIFICANT CHANGE UP (ref 0–0.2)
BASOPHILS NFR BLD AUTO: 0.3 % — SIGNIFICANT CHANGE UP (ref 0–2)
BILIRUB SERPL-MCNC: 0.7 MG/DL — SIGNIFICANT CHANGE UP (ref 0.2–1.2)
BILIRUB UR-MCNC: NEGATIVE — SIGNIFICANT CHANGE UP
BILIRUB UR-MCNC: NEGATIVE — SIGNIFICANT CHANGE UP
BLOOD GAS VENOUS COMPREHENSIVE RESULT: SIGNIFICANT CHANGE UP
BUN SERPL-MCNC: 21 MG/DL — SIGNIFICANT CHANGE UP (ref 7–23)
CALCIUM SERPL-MCNC: 9.5 MG/DL — SIGNIFICANT CHANGE UP (ref 8.4–10.5)
CAST: 1 /LPF — SIGNIFICANT CHANGE UP (ref 0–4)
CHLORIDE SERPL-SCNC: 103 MMOL/L — SIGNIFICANT CHANGE UP (ref 98–107)
CO2 SERPL-SCNC: 24 MMOL/L — SIGNIFICANT CHANGE UP (ref 22–31)
COLOR SPEC: YELLOW — SIGNIFICANT CHANGE UP
COLOR SPEC: YELLOW — SIGNIFICANT CHANGE UP
CREAT SERPL-MCNC: 0.79 MG/DL — SIGNIFICANT CHANGE UP (ref 0.5–1.3)
DIFF PNL FLD: NEGATIVE — SIGNIFICANT CHANGE UP
DIFF PNL FLD: NEGATIVE — SIGNIFICANT CHANGE UP
EGFR: 72 ML/MIN/1.73M2 — SIGNIFICANT CHANGE UP
EGFR: 72 ML/MIN/1.73M2 — SIGNIFICANT CHANGE UP
EOSINOPHIL # BLD AUTO: 0 K/UL — SIGNIFICANT CHANGE UP (ref 0–0.5)
EOSINOPHIL NFR BLD AUTO: 0 % — SIGNIFICANT CHANGE UP (ref 0–6)
FLUAV AG NPH QL: SIGNIFICANT CHANGE UP
FLUBV AG NPH QL: SIGNIFICANT CHANGE UP
GLUCOSE SERPL-MCNC: 143 MG/DL — HIGH (ref 70–99)
GLUCOSE UR QL: NEGATIVE MG/DL — SIGNIFICANT CHANGE UP
GLUCOSE UR QL: NEGATIVE MG/DL — SIGNIFICANT CHANGE UP
HCT VFR BLD CALC: 32.1 % — LOW (ref 34.5–45)
HGB BLD-MCNC: 10.2 G/DL — LOW (ref 11.5–15.5)
IANC: 12.17 K/UL — HIGH (ref 1.8–7.4)
IMM GRANULOCYTES NFR BLD AUTO: 0.6 % — SIGNIFICANT CHANGE UP (ref 0–0.9)
KETONES UR-MCNC: NEGATIVE MG/DL — SIGNIFICANT CHANGE UP
KETONES UR-MCNC: NEGATIVE MG/DL — SIGNIFICANT CHANGE UP
LACTATE BLDV-MCNC: 2.4 MMOL/L — HIGH (ref 0.5–2)
LACTATE SERPL-SCNC: 3.2 MMOL/L — HIGH (ref 0.5–2)
LEUKOCYTE ESTERASE UR-ACNC: NEGATIVE — SIGNIFICANT CHANGE UP
LEUKOCYTE ESTERASE UR-ACNC: NEGATIVE — SIGNIFICANT CHANGE UP
LIDOCAIN IGE QN: 34 U/L — SIGNIFICANT CHANGE UP (ref 7–60)
LYMPHOCYTES # BLD AUTO: 1.06 K/UL — SIGNIFICANT CHANGE UP (ref 1–3.3)
LYMPHOCYTES # BLD AUTO: 7.6 % — LOW (ref 13–44)
MCHC RBC-ENTMCNC: 26.2 PG — LOW (ref 27–34)
MCHC RBC-ENTMCNC: 31.8 G/DL — LOW (ref 32–36)
MCV RBC AUTO: 82.5 FL — SIGNIFICANT CHANGE UP (ref 80–100)
MONOCYTES # BLD AUTO: 0.57 K/UL — SIGNIFICANT CHANGE UP (ref 0–0.9)
MONOCYTES NFR BLD AUTO: 4.1 % — SIGNIFICANT CHANGE UP (ref 2–14)
NEUTROPHILS # BLD AUTO: 12.17 K/UL — HIGH (ref 1.8–7.4)
NEUTROPHILS NFR BLD AUTO: 87.4 % — HIGH (ref 43–77)
NITRITE UR-MCNC: NEGATIVE — SIGNIFICANT CHANGE UP
NITRITE UR-MCNC: NEGATIVE — SIGNIFICANT CHANGE UP
NRBC # BLD AUTO: 0 K/UL — SIGNIFICANT CHANGE UP (ref 0–0)
NRBC # FLD: 0 K/UL — SIGNIFICANT CHANGE UP (ref 0–0)
NRBC BLD AUTO-RTO: 0 /100 WBCS — SIGNIFICANT CHANGE UP (ref 0–0)
PH UR: 6.5 — SIGNIFICANT CHANGE UP (ref 5–8)
PH UR: 6.5 — SIGNIFICANT CHANGE UP (ref 5–8)
PLATELET # BLD AUTO: 227 K/UL — SIGNIFICANT CHANGE UP (ref 150–400)
POTASSIUM SERPL-MCNC: 4.6 MMOL/L — SIGNIFICANT CHANGE UP (ref 3.5–5.3)
POTASSIUM SERPL-SCNC: 4.6 MMOL/L — SIGNIFICANT CHANGE UP (ref 3.5–5.3)
PROT SERPL-MCNC: 7.2 G/DL — SIGNIFICANT CHANGE UP (ref 6–8.3)
PROT UR-MCNC: NEGATIVE MG/DL — SIGNIFICANT CHANGE UP
PROT UR-MCNC: NEGATIVE MG/DL — SIGNIFICANT CHANGE UP
RBC # BLD: 3.89 M/UL — SIGNIFICANT CHANGE UP (ref 3.8–5.2)
RBC # FLD: 13.8 % — SIGNIFICANT CHANGE UP (ref 10.3–14.5)
RBC CASTS # UR COMP ASSIST: 2 /HPF — SIGNIFICANT CHANGE UP (ref 0–4)
RSV RNA NPH QL NAA+NON-PROBE: SIGNIFICANT CHANGE UP
SARS-COV-2 RNA SPEC QL NAA+PROBE: SIGNIFICANT CHANGE UP
SODIUM SERPL-SCNC: 141 MMOL/L — SIGNIFICANT CHANGE UP (ref 135–145)
SOURCE RESPIRATORY: SIGNIFICANT CHANGE UP
SP GR SPEC: 1.01 — SIGNIFICANT CHANGE UP (ref 1–1.03)
SP GR SPEC: 1.01 — SIGNIFICANT CHANGE UP (ref 1–1.03)
SQUAMOUS # UR AUTO: 0 /HPF — SIGNIFICANT CHANGE UP (ref 0–5)
TROPONIN T, HIGH SENSITIVITY RESULT: 15 NG/L — SIGNIFICANT CHANGE UP
TSH SERPL-MCNC: 1.91 UIU/ML — SIGNIFICANT CHANGE UP (ref 0.27–4.2)
UROBILINOGEN FLD QL: 0.2 MG/DL — SIGNIFICANT CHANGE UP (ref 0.2–1)
UROBILINOGEN FLD QL: 0.2 MG/DL — SIGNIFICANT CHANGE UP (ref 0.2–1)
WBC # BLD: 13.93 K/UL — HIGH (ref 3.8–10.5)
WBC # FLD AUTO: 13.93 K/UL — HIGH (ref 3.8–10.5)
WBC UR QL: 0 /HPF — SIGNIFICANT CHANGE UP (ref 0–5)

## 2025-03-28 PROCEDURE — 99285 EMERGENCY DEPT VISIT HI MDM: CPT | Mod: 25

## 2025-03-28 PROCEDURE — 71045 X-RAY EXAM CHEST 1 VIEW: CPT | Mod: 26

## 2025-03-28 PROCEDURE — 74177 CT ABD & PELVIS W/CONTRAST: CPT | Mod: 26

## 2025-03-28 PROCEDURE — 99223 1ST HOSP IP/OBS HIGH 75: CPT

## 2025-03-28 RX ORDER — METFORMIN HYDROCHLORIDE 850 MG/1
1 TABLET ORAL
Refills: 0 | DISCHARGE

## 2025-03-28 RX ORDER — MIRTAZAPINE 30 MG/1
1 TABLET, FILM COATED ORAL
Refills: 0 | DISCHARGE

## 2025-03-28 RX ORDER — ERGOCALCIFEROL 1.25 MG/1
50000 CAPSULE ORAL
Refills: 0 | Status: DISCONTINUED | OUTPATIENT
Start: 2025-03-30 | End: 2025-03-30

## 2025-03-28 RX ORDER — INSULIN LISPRO 100 U/ML
INJECTION, SOLUTION INTRAVENOUS; SUBCUTANEOUS
Refills: 0 | Status: DISCONTINUED | OUTPATIENT
Start: 2025-03-28 | End: 2025-03-30

## 2025-03-28 RX ORDER — HALOPERIDOL 10 MG/1
0.5 TABLET ORAL
Refills: 0 | Status: DISCONTINUED | OUTPATIENT
Start: 2025-03-28 | End: 2025-03-28

## 2025-03-28 RX ORDER — LOSARTAN POTASSIUM 100 MG/1
1 TABLET, FILM COATED ORAL
Refills: 0 | DISCHARGE

## 2025-03-28 RX ORDER — INSULIN LISPRO 100 U/ML
INJECTION, SOLUTION INTRAVENOUS; SUBCUTANEOUS AT BEDTIME
Refills: 0 | Status: DISCONTINUED | OUTPATIENT
Start: 2025-03-28 | End: 2025-03-30

## 2025-03-28 RX ORDER — PIPERACILLIN-TAZO-DEXTROSE,ISO 3.375G/5
3.38 IV SOLUTION, PIGGYBACK PREMIX FROZEN(ML) INTRAVENOUS ONCE
Refills: 0 | Status: COMPLETED | OUTPATIENT
Start: 2025-03-28 | End: 2025-03-28

## 2025-03-28 RX ORDER — SODIUM CHLORIDE 9 G/1000ML
1000 INJECTION, SOLUTION INTRAVENOUS
Refills: 0 | Status: DISCONTINUED | OUTPATIENT
Start: 2025-03-28 | End: 2025-03-30

## 2025-03-28 RX ORDER — DEXTROSE 50 % IN WATER 50 %
12.5 SYRINGE (ML) INTRAVENOUS ONCE
Refills: 0 | Status: DISCONTINUED | OUTPATIENT
Start: 2025-03-28 | End: 2025-03-30

## 2025-03-28 RX ORDER — ERGOCALCIFEROL 1.25 MG/1
1 CAPSULE ORAL
Refills: 0 | DISCHARGE

## 2025-03-28 RX ORDER — DEXTROSE 50 % IN WATER 50 %
25 SYRINGE (ML) INTRAVENOUS ONCE
Refills: 0 | Status: DISCONTINUED | OUTPATIENT
Start: 2025-03-28 | End: 2025-03-30

## 2025-03-28 RX ORDER — ENOXAPARIN SODIUM 100 MG/ML
40 INJECTION SUBCUTANEOUS EVERY 24 HOURS
Refills: 0 | Status: DISCONTINUED | OUTPATIENT
Start: 2025-03-28 | End: 2025-03-30

## 2025-03-28 RX ORDER — LOSARTAN POTASSIUM 100 MG/1
25 TABLET, FILM COATED ORAL DAILY
Refills: 0 | Status: DISCONTINUED | OUTPATIENT
Start: 2025-03-28 | End: 2025-03-30

## 2025-03-28 RX ORDER — PIPERACILLIN-TAZO-DEXTROSE,ISO 3.375G/5
3.38 IV SOLUTION, PIGGYBACK PREMIX FROZEN(ML) INTRAVENOUS EVERY 8 HOURS
Refills: 0 | Status: DISCONTINUED | OUTPATIENT
Start: 2025-03-28 | End: 2025-03-30

## 2025-03-28 RX ORDER — GLUCAGON 3 MG/1
1 POWDER NASAL ONCE
Refills: 0 | Status: DISCONTINUED | OUTPATIENT
Start: 2025-03-28 | End: 2025-03-30

## 2025-03-28 RX ORDER — DEXTROSE 50 % IN WATER 50 %
15 SYRINGE (ML) INTRAVENOUS ONCE
Refills: 0 | Status: DISCONTINUED | OUTPATIENT
Start: 2025-03-28 | End: 2025-03-30

## 2025-03-28 RX ORDER — HALOPERIDOL 10 MG/1
0.5 TABLET ORAL
Refills: 0 | Status: DISCONTINUED | OUTPATIENT
Start: 2025-03-28 | End: 2025-03-30

## 2025-03-28 RX ORDER — AMLODIPINE BESYLATE 10 MG/1
5 TABLET ORAL DAILY
Refills: 0 | Status: DISCONTINUED | OUTPATIENT
Start: 2025-03-28 | End: 2025-03-30

## 2025-03-28 RX ORDER — ACETAMINOPHEN 500 MG/5ML
650 LIQUID (ML) ORAL EVERY 6 HOURS
Refills: 0 | Status: DISCONTINUED | OUTPATIENT
Start: 2025-03-28 | End: 2025-03-30

## 2025-03-28 RX ADMIN — Medication 1 APPLICATION(S): at 19:29

## 2025-03-28 RX ADMIN — ENOXAPARIN SODIUM 40 MILLIGRAM(S): 100 INJECTION SUBCUTANEOUS at 22:45

## 2025-03-28 RX ADMIN — Medication 1000 MILLILITER(S): at 07:31

## 2025-03-28 RX ADMIN — Medication 25 GRAM(S): at 22:41

## 2025-03-28 RX ADMIN — HALOPERIDOL 0.5 MILLIGRAM(S): 10 TABLET ORAL at 14:08

## 2025-03-28 RX ADMIN — Medication 200 GRAM(S): at 13:21

## 2025-03-28 NOTE — ED ADULT NURSE NOTE - NSFALLRISKINTERV_ED_ALL_ED

## 2025-03-28 NOTE — ED ADULT NURSE REASSESSMENT NOTE - NS ED NURSE REASSESS COMMENT FT1
Pt laying in stretcher, no signs and symptoms of discomfort. Daughter at bedside. Pt speaks only Malayalam which daughter is helping to translate. NS bolus infusing via #20 gauge angio cath to right antecubital patent, no redness or swelling noted. Abdomen soft/nondistended. Pt states feels some pressure to lower abdomen but no pain. Denies any N/V/D, palpitations, dizziness, SOB, fever/chills. Pending urine sample collection. Pt will attempt to urinate in bathroom first after fluids infused. If unsuccessful, Pt stated to daughter ok for straight catherization collection. Skin intact. Call light in reach and verbalized understanding of use.

## 2025-03-28 NOTE — ED ADULT NURSE NOTE - NSFALLASSESSNEED_ED_ALL_ED
Detail Level: Generalized Detail Level: Detailed yes Prescription Strength Graduated Compression Stockings Recommendations: The patient was counseled that prescription strength graduated compression stockings should be worn for all waking hours. They will follow up with a venous specialist to monitor graduated compression stocking usage and their symptoms.

## 2025-03-28 NOTE — H&P ADULT - HISTORY OF PRESENT ILLNESS
89F DM2, HTN, HLD p/w lower abdominal pain for the last few days. During this time had frequent, smaller caliber than usual (and sometimes watery) stools, and endorses difficulty defecating. No BRBPR, hematochezia. Abd pain is crampy, generalized, not improving. Pt took home UTI test that was positive and was prescribed macrobid yesterday, of which she took 1 dose without relief. Pt lives at home with daughter and granddaughter, as well as a 24-hour aide. She ambulates with a cane but is unsteady. Family also endorse pt having episodes of confusion and agitation/sundowning.

## 2025-03-28 NOTE — ED ADULT TRIAGE NOTE - NSWEIGHTCALCTOOLDRUG_GEN_A_CORE
used Mustarde Flap Text: The defect edges were debeveled with a #15 scalpel blade.  Given the size, depth and location of the defect and the proximity to free margins a Mustarde flap was deemed most appropriate.  Using a sterile surgical marker, an appropriate flap was drawn incorporating the defect. The area thus outlined was incised with a #15 scalpel blade.  The skin margins were undermined to an appropriate distance in all directions utilizing iris scissors.

## 2025-03-28 NOTE — ED ADULT TRIAGE NOTE - RESPIRATORY RATE (BREATHS/MIN)
Your current Plastic Surgery problem we are working together to treat is: Finger Injury.      Take unaffected hand and hold until you feel resistance for 30 seconds, 10 reps, 4 times a day        FOLLOW-UP  It is recommended you schedule a follow-up appointment with Dr. Islas: 1 week.      Office hours are 8:00 am to 5:00 pm Monday through Friday. If it is urgent that you speak with someone outside of these hours, the [facility] will be able to assist you. You can reach the office by calling [phone].    Thank you for choosing Kelby Islas M.D. as your Plastic Surgery provider!    At [facility], one important tool we use to improve our patient services is our Patient Survey. Following your visit you may receive our survey in the mail.     · Please take the time to complete the survey.     · If your visit with us was great, we want to hear about it.     · If we can improve, please let us know how.        16

## 2025-03-28 NOTE — ED ADULT NURSE NOTE - OBJECTIVE STATEMENT
Received pt in room 7. pt is A&Ox2, past medical history of DM, HLD, HTN. Pt came to the ED due to 1 day of lower abdominal pain, weakness, confusion. Pt speaks Malaymanan and daughter translates at bedside. Pt breathing is equal and nonlabored. pt is normal sinus on cardiac monitor. Pt abdomen is soft and nondistended. Pt states he has pain and burning sensation when urinating. Pt denies any chest pain, SOB, headache, nausea, vomiting, diarrhea, fever or chills. pt has a 20g to the left AC. pt safety maintained.

## 2025-03-28 NOTE — ED PROVIDER NOTE - PROGRESS NOTE DETAILS
Of note, patient's daughter is a nurse manager at [employer] and has expressed desire to have patient return home for continued management. there is a 24-hour aide present and granddaughter is able to also help with providing care for patient.  If patient is stable and does not require IV antibiotics or  higher level of care, patient's granddaughter is amenable to having patient return home. Jr Sanchez MD: Signed out to me pending labs and CXR. Patient likely to require straight cath for urine as well. Disposition pending results and re-evaluation with family. Frankie Sanchez MD: CXR does not show focal consolidation, patient has mild leukocytosis of unclear etiology. I discussed care with daughter and two granddaughters (one of which is nurse) who now endorse patient having abdominal pain at home with difficulty passing stool. Will obtain a CT abdomen and pelvis to assess for intra-abdominal infection. If negative, family is comfortable with discharge, pending result of blood cultures already obtained.

## 2025-03-28 NOTE — PHARMACOTHERAPY INTERVENTION NOTE - COMMENTS
Medication history is complete. Medication list updated in Outpatient Medication Record (OMR) via [facility] and family member (granddaughter).    To Note:   -Patient recently on Macrobid 100mg BID for 1 week, as per granddaughter only took 1 dose     Home Medications:  amLODIPine 5 mg oral tablet: 1 tab(s) orally once a day   ergocalciferol 1.25 mg (50,000 intl units) oral capsule: 1 cap(s) orally once a week on Sunday   losartan 25 mg oral tablet: 1 tab(s) orally once a day   metFORMIN 500 mg oral tablet, extended release: 1 tab(s) orally once a day   mirtazapine 15 mg oral tablet: 1 tab(s) orally once a day (in the evening)        Please call spectra [phone] if you have any questions.

## 2025-03-28 NOTE — PATIENT PROFILE ADULT - NSPROHMDIABETMGMTSTRAT_GEN_A_NUR
none

## 2025-03-28 NOTE — ED PROVIDER NOTE - CLINICAL SUMMARY MEDICAL DECISION MAKING FREE TEXT BOX
89 y.o. FPMHx significant for DM, HLD, HTN presenting for 1 day of generalized lower abdominal pain, increased small caliber bowel movements, weakness, confusion, positive at home UTI test. 89 y.o. FPMHx significant for DM, HLD, HTN presenting for 1 day of generalized lower abdominal pain, increased small caliber bowel movements, weakness, confusion, positive at home UTI test. PE without focal findings. Patient on room air, heart murmur appreciated otherwise normal rate and rhythm, lungs: CTA, abdomen soft, nontender, no lower extremity edema appreciated.  Given HPI, concern for infectious versus metabolic process. Will obtain CBC, CMP to evaluate for signs of anemia, leukocytosis, electrolyte derangement, hypoglycemia. Will obtain TSH to evaluate for signs of hypothyroidism causing weakness.  Otherwise, given patient age and poor historian will obtain troponin, EKG, chest x-ray to evaluate for acute cardiac pathology.  Finally given acute delirium/change in mental status, will obtain UA and urine culture to evaluate for signs of urinary tract infection.  Further steps in management pending initial workup.

## 2025-03-28 NOTE — ED PROVIDER NOTE - OBJECTIVE STATEMENT
89 y.o. FPMHx significant for DM, HLD, HTN presenting for 1 day of generalized lower abdominal pain, increased small caliber bowel movements, weakness, confusion, positive at home UTI test.  Per daughter who accompanies patient and granddaughter over the phone who patient lives with, yesterday patient appeared more confused and weak.  Patient reportedly A&Ox3 at baseline and ambulates with a cane and with assistance.  During the day patient did complain of lower abdominal pain and had small caliber bowel movements without any blood appreciated.  Granddaughter notes that patient had temperature of 95.1 but increased with blankets.  Patient otherwise had not complain of any symptoms however granddaughter states patient is a poor historian.  No alleviating or exacerbating factors for the abdominal pain.  No sick contacts.  Otherwise granddaughter  denies HA, CP, SOB, dysuria. 89 y.o. FPMHx significant for DM, HLD, HTN presenting for 1 day of generalized lower abdominal pain, increased small caliber bowel movements, weakness, confusion, positive at home UTI test.  Per daughter who accompanies patient and, granddaughter over the phone, who patient lives with, yesterday patient appeared more confused and weak. Patient reportedly A&Ox3 at baseline and ambulates with a cane and with assistance. During the day patient did complain of lower abdominal pain and had small caliber bowel movements without any blood appreciated.  Granddaughter notes that patient had temperature of 95.1 which corrected with blankets. Pt otherwise does not complain of any other symptoms. No alleviating or exacerbating factors for the abdominal pain.  No sick contacts.  Otherwise granddaughter  denies HA, CP, SOB, dysuria.

## 2025-03-28 NOTE — PATIENT PROFILE ADULT - FALL HARM RISK - HARM RISK INTERVENTIONS

## 2025-03-28 NOTE — ED PROVIDER NOTE - ATTENDING CONTRIBUTION TO CARE
DR. SIERRA, ATTENDING MD-  I performed a face to face bedside interview with the patient regarding history of present illness, review of symptoms and past medical history. I completed an independent physical exam.  I have discussed the patient's plan of care with the resident.   Documentation as above in the note.    88 y/o female h/o dm hld htn with c/o suprapubic abd pain hematuria pos home uti test and mild confusion from baseline.  Eval for uti pna viral syndrome lyte abn.  Obtain cbc cmp vbg ua ucx cxr viral swab give ivf bolus likely admission for further care and evaluation.

## 2025-03-28 NOTE — ED ADULT NURSE REASSESSMENT NOTE - NS ED NURSE REASSESS COMMENT FT1
pt laying in stretcher, accompanied by daughter at bedside. No signs and symptoms of discomfort. Breathing is unlabored. Pt denies any abdominal pain , N/V, dizziness, palpitations, SOB, chills. abdomen remains soft/nondistended, nontender to palpation. Pending results of xray. Pt and daughter verbalized understanding of plan of care.

## 2025-03-28 NOTE — PATIENT PROFILE ADULT - FUNCTIONAL ASSESSMENT - BASIC MOBILITY 6.
2-calculated by average/Not able to assess (calculate score using [facility] averaging method)

## 2025-03-28 NOTE — ED ADULT NURSE REASSESSMENT NOTE - NS ED NURSE REASSESS COMMENT FT1
Daughter states she will be leaving for short period and then coming back. Daughter states home aide will be coming to stay with pt but has not arrived yet. Pt became very agitated and restless, trying to get out of bed. Daughter explained to pt in [facility] but pt still agitated and attempting to get our from end of bed while both side rails up. Pt is admitted. MAR called at [id number] . Dr. Rodriguez came to eval pt. Haldol 0.5mg IVP given as ordered for pt's increase agitation. Pt redirected by daughter. Md spoke to daughter, informing daughter to stay with pt until home aide arrives. Daughter verbalized understanding of plan of care.

## 2025-03-28 NOTE — H&P ADULT - ASSESSMENT
89F DM2, HTN, HLD p/w lower abdominal pain for the last few days, found to have sepsis due to colitis with diverticulitis.    # Sepsis: Leukocytosis and tachycardia in setting of presumed/suspected infectious colitis/diverticulitis.  -     # DM2:    # HTN:    # HLD:    # Agitation:    # Frailty: Per family, pt ambulates with cane and is unsteady at home.  - Fall risk precautions  - PT consult    # VTE prophylaxis: Given age and relative immobility, will order lovenox 40mg daily.     89F DM2, HTN, HLD p/w lower abdominal pain for the last few days, found to have sepsis due to colitis with diverticulitis.    # Sepsis: Leukocytosis and tachycardia in setting of presumed/suspected infectious colitis/diverticulitis.  - Lactate 1.7, will repeat this afternoon  - Resume IV fluids for overnight, then reassess  - Will treat with zosyn  - F/u blood and urine cultures  - Check stool viral/bacterial panel    # DM2: No recent A1c. Serum and POC glucose mildly elevated on presentation.  - Hold metformin in setting of sepsis  - Will check POC glucose TID qAC and qHS  - Cover with correctional admelog  - Check A1c in AM    # HTN: BP has been at/near goal of < 140/90.  - Continue to monitor BP closely  - Continue losartan 25mg daily and amlodipine 5mg daily, with hold parameter    # HLD: Without acute complication.  - Consider non-urgent, outpatient fasting lipid panel    # Agitation: Suspect sundowning with some degree of cognitive decline.  - Called bed board to try to secure a private room where family can stay to reorient pt and translate  - Check EKG for QTc in case pt needs additional anti-psychotic medications     # Frailty: Per family, pt ambulates with cane and is unsteady at home.  - Fall risk precautions  - PT consult    # VTE prophylaxis: Given age and relative immobility, will order lovenox 40mg daily.     89F DM2, HTN, HLD p/w lower abdominal pain for the last few days, found to have sepsis due to colitis with diverticulitis.    # Sepsis: Leukocytosis and tachycardia in setting of presumed/suspected infectious colitis/diverticulitis.  - Lactate 1.7, will repeat this afternoon  - Resume IV fluids for overnight, then reassess  - Will treat with zosyn  - F/u blood and urine cultures  - Check stool viral/bacterial panel    # DM2: No recent A1c. Serum and POC glucose mildly elevated on presentation.  - Hold metformin in setting of sepsis  - Will check POC glucose TID qAC and qHS  - Cover with correctional admelog  - Check A1c in AM    # HTN: BP has been at/near goal of < 140/90.  - Continue to monitor BP closely  - Continue losartan 25mg daily and amlodipine 5mg daily, with hold parameter    # HLD: Without acute complication.  - Consider non-urgent, outpatient fasting lipid panel    # Agitation: Suspect sundowning with some degree of cognitive decline.  - Called bed board to try to secure a private room where family can stay to reorient pt and translate  - Check EKG for QTc in case pt needs additional anti-psychotic medications     # Frailty: Per family, pt ambulates with cane and is unsteady at home.  - Fall risk precautions  - PT consult  - Continue ergocalciferol and check Vitamin D    # VTE prophylaxis: Given age and relative immobility, will order lovenox 40mg daily.

## 2025-03-28 NOTE — ED ADULT TRIAGE NOTE - NS ED TRIAGE AVPU SCALE
Khloe Pavon Alert-The patient is alert, awake and responds to voice. The patient is oriented to time, place, and person. The triage nurse is able to obtain subjective information.

## 2025-03-29 LAB
24R-OH-CALCIDIOL SERPL-MCNC: 60.9 NG/ML — SIGNIFICANT CHANGE UP
A1C WITH ESTIMATED AVERAGE GLUCOSE RESULT: 4.9 % — SIGNIFICANT CHANGE UP (ref 4–5.6)
ANION GAP SERPL CALC-SCNC: 14 MMOL/L — SIGNIFICANT CHANGE UP (ref 7–14)
BUN SERPL-MCNC: 15 MG/DL — SIGNIFICANT CHANGE UP (ref 7–23)
CALCIUM SERPL-MCNC: 8.6 MG/DL — SIGNIFICANT CHANGE UP (ref 8.4–10.5)
CHLORIDE SERPL-SCNC: 105 MMOL/L — SIGNIFICANT CHANGE UP (ref 98–107)
CO2 SERPL-SCNC: 21 MMOL/L — LOW (ref 22–31)
CREAT SERPL-MCNC: 0.83 MG/DL — SIGNIFICANT CHANGE UP (ref 0.5–1.3)
EGFR: 68 ML/MIN/1.73M2 — SIGNIFICANT CHANGE UP
EGFR: 68 ML/MIN/1.73M2 — SIGNIFICANT CHANGE UP
ESTIMATED AVERAGE GLUCOSE: 94 — SIGNIFICANT CHANGE UP
GLUCOSE SERPL-MCNC: 109 MG/DL — HIGH (ref 70–99)
HCT VFR BLD CALC: 29.9 % — LOW (ref 34.5–45)
HGB BLD-MCNC: 9.7 G/DL — LOW (ref 11.5–15.5)
LACTATE BLDV-MCNC: 2.1 MMOL/L — HIGH (ref 0.5–2)
MAGNESIUM SERPL-MCNC: 1.8 MG/DL — SIGNIFICANT CHANGE UP (ref 1.6–2.6)
MCHC RBC-ENTMCNC: 26.8 PG — LOW (ref 27–34)
MCHC RBC-ENTMCNC: 32.4 G/DL — SIGNIFICANT CHANGE UP (ref 32–36)
MCV RBC AUTO: 82.6 FL — SIGNIFICANT CHANGE UP (ref 80–100)
MRSA PCR RESULT.: SIGNIFICANT CHANGE UP
NRBC # BLD AUTO: 0 K/UL — SIGNIFICANT CHANGE UP (ref 0–0)
NRBC # FLD: 0 K/UL — SIGNIFICANT CHANGE UP (ref 0–0)
NRBC BLD AUTO-RTO: 0 /100 WBCS — SIGNIFICANT CHANGE UP (ref 0–0)
PHOSPHATE SERPL-MCNC: 3.1 MG/DL — SIGNIFICANT CHANGE UP (ref 2.5–4.5)
PLATELET # BLD AUTO: 187 K/UL — SIGNIFICANT CHANGE UP (ref 150–400)
POTASSIUM SERPL-MCNC: 4 MMOL/L — SIGNIFICANT CHANGE UP (ref 3.5–5.3)
POTASSIUM SERPL-SCNC: 4 MMOL/L — SIGNIFICANT CHANGE UP (ref 3.5–5.3)
RBC # BLD: 3.62 M/UL — LOW (ref 3.8–5.2)
RBC # FLD: 14.1 % — SIGNIFICANT CHANGE UP (ref 10.3–14.5)
S AUREUS DNA NOSE QL NAA+PROBE: SIGNIFICANT CHANGE UP
SODIUM SERPL-SCNC: 140 MMOL/L — SIGNIFICANT CHANGE UP (ref 135–145)
WBC # BLD: 15.92 K/UL — HIGH (ref 3.8–10.5)
WBC # FLD AUTO: 15.92 K/UL — HIGH (ref 3.8–10.5)

## 2025-03-29 PROCEDURE — 99232 SBSQ HOSP IP/OBS MODERATE 35: CPT

## 2025-03-29 RX ADMIN — ENOXAPARIN SODIUM 40 MILLIGRAM(S): 100 INJECTION SUBCUTANEOUS at 22:26

## 2025-03-29 RX ADMIN — Medication 25 GRAM(S): at 06:00

## 2025-03-29 RX ADMIN — AMLODIPINE BESYLATE 5 MILLIGRAM(S): 10 TABLET ORAL at 06:02

## 2025-03-29 RX ADMIN — Medication 25 GRAM(S): at 13:35

## 2025-03-29 RX ADMIN — Medication 1 APPLICATION(S): at 06:00

## 2025-03-29 RX ADMIN — LOSARTAN POTASSIUM 25 MILLIGRAM(S): 100 TABLET, FILM COATED ORAL at 06:02

## 2025-03-29 RX ADMIN — Medication 75 MILLILITER(S): at 02:10

## 2025-03-29 RX ADMIN — Medication 25 GRAM(S): at 22:25

## 2025-03-29 NOTE — PROGRESS NOTE ADULT - SUBJECTIVE AND OBJECTIVE BOX
[facility] Division of Hospital Medicine  Gildardo Herbert M.D  Pager [phone]  Available via MS Teams    SUBJECTIVE / OVERNIGHT EVENTS: No acute events overnight. Patient denies any chest pain SOB, fevers or chills. Daughter at bedside states that patient is doing well overall.     ADDITIONAL REVIEW OF SYSTEMS:    MEDICATIONS  (STANDING):  amLODIPine   Tablet 5 milliGRAM(s) Oral daily  chlorhexidine 2% Cloths 1 Application(s) Topical <User Schedule>  dextrose 5%. 1000 milliLiter(s) (100 mL/Hr) IV Continuous <Continuous>  dextrose 5%. 1000 milliLiter(s) (50 mL/Hr) IV Continuous <Continuous>  dextrose 50% Injectable 25 Gram(s) IV Push once  dextrose 50% Injectable 12.5 Gram(s) IV Push once  dextrose 50% Injectable 25 Gram(s) IV Push once  enoxaparin Injectable 40 milliGRAM(s) SubCutaneous every 24 hours  glucagon  Injectable 1 milliGRAM(s) IntraMuscular once  insulin lispro (ADMELOG) corrective regimen sliding scale   SubCutaneous three times a day before meals  insulin lispro (ADMELOG) corrective regimen sliding scale   SubCutaneous at bedtime  losartan 25 milliGRAM(s) Oral daily  piperacillin/tazobactam IVPB.. 3.375 Gram(s) IV Intermittent every 8 hours  sodium chloride 0.9%. 1000 milliLiter(s) (75 mL/Hr) IV Continuous <Continuous>    MEDICATIONS  (PRN):  acetaminophen     Tablet .. 650 milliGRAM(s) Oral every 6 hours PRN Temp greater or equal to 38C (100.4F), Mild Pain (1 - 3)  dextrose Oral Gel 15 Gram(s) Oral once PRN Blood Glucose LESS THAN 70 milliGRAM(s)/deciliter  haloperidol    Injectable 0.5 milliGRAM(s) IV Push every 1 hour PRN Agitation      I&O's Summary    28 Mar 2025 07:01  -  29 Mar 2025 07:00  --------------------------------------------------------  IN: 1115 mL / OUT: 300 mL / NET: 815 mL        PHYSICAL EXAM:  Vital Signs Last 24 Hrs  T(C): 37 (29 Mar 2025 05:58), Max: 37.6 (28 Mar 2025 12:40)  T(F): 98.6 (29 Mar 2025 05:58), Max: 99.6 (28 Mar 2025 12:40)  HR: 82 (29 Mar 2025 05:58) (82 - 94)  BP: 124/60 (29 Mar 2025 05:58) (124/60 - 152/72)  BP(mean): --  RR: 17 (29 Mar 2025 05:58) (16 - 18)  SpO2: 100% (29 Mar 2025 05:58) (99% - 100%)    Parameters below as of 29 Mar 2025 05:58  Patient On (Oxygen Delivery Method): room air      CONSTITUTIONAL: NAD, elderly female   RESPIRATORY: Normal respiratory effort; lungs are clear to auscultation bilaterally  CARDIOVASCULAR: Regular rate and rhythm, normal S1 and S2, no murmur/rub/gallop  ABDOMEN: Nontender to palpation, normoactive bowel sounds, no rebound/guarding  MUSCULOSKELETAL: no joint swelling or tenderness to palpation  PSYCH: A+O to person, place, and time  NEUROLOGY: no gross sensory deficits   SKIN: No rashes; no palpable lesions    LABS:                        9.7    15.92 )-----------( 187      ( 29 Mar 2025 06:21 )             29.9     03-29    140  |  105  |  15  ----------------------------<  109[H]  4.0   |  21[L]  |  0.83    Ca    8.6      29 Mar 2025 06:21  Phos  3.1     03-29  Mg     1.80     03-29    TPro  7.2  /  Alb  4.0  /  TBili  0.7  /  DBili  x   /  AST  29  /  ALT  9   /  AlkPhos  77  03-28          Urinalysis Basic - ( 29 Mar 2025 06:21 )    Color: x / Appearance: x / SG: x / pH: x  Gluc: 109 mg/dL / Ketone: x  / Bili: x / Urobili: x   Blood: x / Protein: x / Nitrite: x   Leuk Esterase: x / RBC: x / WBC x   Sq Epi: x / Non Sq Epi: x / Bacteria: x        Urinalysis with Rflx Culture (collected 28 Mar 2025 09:40)    Culture - Blood (collected 28 Mar 2025 07:15)  Source: Blood Blood-Peripheral  Preliminary Report (29 Mar 2025 11:00):    No growth at 24 hours    Culture - Blood (collected 28 Mar 2025 06:45)  Source: Blood Blood-Peripheral  Preliminary Report (29 Mar 2025 11:00):    No growth at 24 hours

## 2025-03-29 NOTE — PHYSICAL THERAPY INITIAL EVALUATION ADULT - ADDITIONAL COMMENTS
Pts daughter reports she requires a cane for ambulation as well as person assist at her contralateral UE for household ambulation due to unsteadiness with gait. Pt utilizes a wheelchair when traveling in he community. Pts daughter reports she has a 24 hour home health aide and that their home is equipped with a shower chair and grab bars. Pt requires assist for all ADLs and MRADLS. Vitals taken once seated at edge of bed, BP = 120/55mmHg. Pt left in semisupine position in bed, all lines and tubes in tact, daughter at bed side, call bhagat within reach, RN Vance made aware.

## 2025-03-29 NOTE — PHYSICAL THERAPY INITIAL EVALUATION ADULT - PERTINENT HX OF CURRENT PROBLEM, REHAB EVAL
Pt is a 89year old female presenting with lower abdominal pain for the last few days. During this time had frequent, smaller caliber than usual (and sometimes watery) stools, and endorses difficulty defecating. No BRBPR, hematochezia. Abd pain is crampy, generalized, not improving. Pt took home UTI test that was positive and was prescribed macrobid yesterday, of which she took 1 dose without relief. Pt lives at home with daughter and granddaughter, as well as a 24-hour aide. She ambulates with a cane but is unsteady. Family also endorse pt having episodes of confusion and agitation/sundowning. Pt is a 89 year old female presenting with lower abdominal pain for the last few days, found to have sepsis due to colitis with diverticulitis.

## 2025-03-29 NOTE — PHYSICAL THERAPY INITIAL EVALUATION ADULT - GENERAL OBSERVATIONS, REHAB EVAL
Pt received in supine position in bed in NAD, daughter at bedside, all lines and tubes + IV + Primafit intact. Pts daughter reports low BP prior to session.

## 2025-03-29 NOTE — PROGRESS NOTE ADULT - ASSESSMENT
89F DM2, HTN, HLD p/w lower abdominal pain for the last few days, found to have sepsis due to colitis with diverticulitis.    # Sepsis: Leukocytosis and tachycardia in setting of presumed/suspected infectious colitis/diverticulitis.  - lactate 2.1 this AM   - Resume IV fluids for overnight, then reassess  - Will treat with zosyn  - blood cultures NGTD, UA negative   - Check stool viral/bacterial panel    #DM2: No recent A1c. Serum and POC glucose mildly elevated on presentation.  - Hold metformin in setting of sepsis  - Will check POC glucose TID qAC and qHS  - Cover with correctional ADMEL[facility]   A1c 4.9%    #HTN: BP has been at/near goal of < 140/90.  - Continue to monitor BP closely  - Continue losartan 25mg daily and amlodipine 5mg daily, with hold parameter    #HLD: Without acute complication.  - Consider non-urgent, outpatient fasting lipid panel    #Agitation: Suspect sundowning with some degree of cognitive decline.  - Called bed board to try to secure a private room where family can stay to reorient pt and translate  - Check EKG for QTc in case pt needs additional anti-psychotic medications     #Frailty: Per family, pt ambulates with cane and is unsteady at home.  - Fall risk precautions  - PT consult pending   - Continue ergocalciferol and check Vitamin D    # VTE prophylaxis: Given age and relative immobility, will order Lovenox 40mg daily.

## 2025-03-29 NOTE — PHYSICAL THERAPY INITIAL EVALUATION ADULT - ASSISTIVE DEVICE FOR TRANSFER: GAIT, REHAB EVAL
Attempted ambulation with quad cane however pt required manual hand held assist at contralateral upper extremity, pt completed ambulation with rolling walker./rolling walker/quad cane

## 2025-03-30 VITALS
OXYGEN SATURATION: 98 % | DIASTOLIC BLOOD PRESSURE: 60 MMHG | RESPIRATION RATE: 18 BRPM | HEART RATE: 93 BPM | SYSTOLIC BLOOD PRESSURE: 125 MMHG | TEMPERATURE: 99 F

## 2025-03-30 LAB
HCT VFR BLD CALC: 30.8 % — LOW (ref 34.5–45)
HGB BLD-MCNC: 9.7 G/DL — LOW (ref 11.5–15.5)
LACTATE SERPL-SCNC: 0.7 MMOL/L — SIGNIFICANT CHANGE UP (ref 0.5–2)
MCHC RBC-ENTMCNC: 26.5 PG — LOW (ref 27–34)
MCHC RBC-ENTMCNC: 31.5 G/DL — LOW (ref 32–36)
MCV RBC AUTO: 84.2 FL — SIGNIFICANT CHANGE UP (ref 80–100)
NRBC # BLD AUTO: 0 K/UL — SIGNIFICANT CHANGE UP (ref 0–0)
NRBC # FLD: 0 K/UL — SIGNIFICANT CHANGE UP (ref 0–0)
NRBC BLD AUTO-RTO: 0 /100 WBCS — SIGNIFICANT CHANGE UP (ref 0–0)
PLATELET # BLD AUTO: 207 K/UL — SIGNIFICANT CHANGE UP (ref 150–400)
RBC # BLD: 3.66 M/UL — LOW (ref 3.8–5.2)
RBC # FLD: 14 % — SIGNIFICANT CHANGE UP (ref 10.3–14.5)
WBC # BLD: 12.62 K/UL — HIGH (ref 3.8–10.5)
WBC # FLD AUTO: 12.62 K/UL — HIGH (ref 3.8–10.5)

## 2025-03-30 PROCEDURE — 99239 HOSP IP/OBS DSCHRG MGMT >30: CPT

## 2025-03-30 RX ORDER — CIPROFLOXACIN HCL 250 MG
500 TABLET ORAL EVERY 12 HOURS
Refills: 0 | Status: DISCONTINUED | OUTPATIENT
Start: 2025-03-30 | End: 2025-03-30

## 2025-03-30 RX ORDER — MIRTAZAPINE 30 MG/1
1 TABLET, FILM COATED ORAL
Refills: 0 | DISCHARGE

## 2025-03-30 RX ORDER — AMLODIPINE BESYLATE 10 MG/1
1 TABLET ORAL
Qty: 0 | Refills: 0 | DISCHARGE

## 2025-03-30 RX ORDER — METRONIDAZOLE 250 MG
500 TABLET ORAL EVERY 12 HOURS
Refills: 0 | Status: DISCONTINUED | OUTPATIENT
Start: 2025-03-31 | End: 2025-03-30

## 2025-03-30 RX ORDER — CIPROFLOXACIN HCL 250 MG
1 TABLET ORAL
Qty: 10 | Refills: 0
Start: 2025-03-30 | End: 2025-04-03

## 2025-03-30 RX ORDER — AMLODIPINE BESYLATE 10 MG/1
1 TABLET ORAL
Refills: 0 | DISCHARGE

## 2025-03-30 RX ORDER — METRONIDAZOLE 250 MG
1 TABLET ORAL
Qty: 10 | Refills: 0
Start: 2025-03-30 | End: 2025-04-03

## 2025-03-30 RX ADMIN — Medication 25 GRAM(S): at 07:01

## 2025-03-30 RX ADMIN — AMLODIPINE BESYLATE 5 MILLIGRAM(S): 10 TABLET ORAL at 07:01

## 2025-03-30 RX ADMIN — Medication 1 APPLICATION(S): at 07:02

## 2025-03-30 RX ADMIN — LOSARTAN POTASSIUM 25 MILLIGRAM(S): 100 TABLET, FILM COATED ORAL at 07:01

## 2025-03-30 NOTE — DISCHARGE NOTE PROVIDER - NSDCMRMEDTOKEN_GEN_ALL_CORE_FT
amLODIPine 5 mg oral tablet: 1 tab(s) orally once a day  atorvastatin 20 mg oral tablet: 1 tab(s) orally once a day  chlorhexidine 0.12% mucous membrane liquid: 15 milliliter(s) orally 2 times a day   ergocalciferol 1.25 mg (50,000 intl units) oral capsule: 1 cap(s) orally once a week on Sunday  Glucophage 500 mg oral tablet: 1 tab(s) orally 2 times a day  Lidocaine Viscous 2% mucous membrane solution: Gargle every 3 hours  as needed for throat pain   lisinopril 2.5 mg oral tablet: 1 tab(s) orally once a day  losartan 25 mg oral tablet: 1 tab(s) orally once a day  metFORMIN 500 mg oral tablet, extended release: 1 tab(s) orally once a day  nystatin 100,000 units/mL oral suspension: 5 milliliter(s) orally (swish and swallow) every 6 hours   pantoprazole 40 mg oral delayed release tablet: 1 tab(s) orally once a day (before a meal)   ergocalciferol 1.25 mg (50,000 intl units) oral capsule: 1 cap(s) orally once a week on Sunday  losartan 25 mg oral tablet: 1 tab(s) orally once a day  metFORMIN 500 mg oral tablet, extended release: 1 tab(s) orally once a day  mirtazapine 15 mg oral tablet: 1 tab(s) orally once a day (in the evening)  Norvasc 5 mg oral tablet: 1 tab(s) orally once a day   ciprofloxacin 500 mg oral tablet: 1 tab(s) orally every 12 hours  ergocalciferol 1.25 mg (50,000 intl units) oral capsule: 1 cap(s) orally once a week on Sunday  losartan 25 mg oral tablet: 1 tab(s) orally once a day  metFORMIN 500 mg oral tablet, extended release: 1 tab(s) orally once a day  metroNIDAZOLE 500 mg oral tablet: 1 tab(s) orally every 12 hours  mirtazapine 15 mg oral tablet: 1 tab(s) orally once a day (in the evening)  Norvasc 5 mg oral tablet: 1 tab(s) orally once a day

## 2025-03-30 NOTE — DISCHARGE NOTE PROVIDER - NSDCCPCAREPLAN_GEN_ALL_CORE_FT
PRINCIPAL DISCHARGE DIAGNOSIS  Diagnosis: Colitis  Assessment and Plan of Treatment: You came in for diarrhea and abdominal pain. You were found to have a CT scan that showed inflammation in your colon. You were started on empiricc antbiotics. Bcx with no growth to date. Your WBC count improved. You will need to take ciprofloxacin and flagyl for 5 days.

## 2025-03-30 NOTE — DISCHARGE NOTE PROVIDER - CARE PROVIDER_API CALL
BANDAR JOHNSON  [address], [address]  [place] [address]  Phone: [phone]  Fax: [phone]  Established Patient  Follow Up Time: 1 week

## 2025-03-30 NOTE — CHART NOTE - NSCHARTNOTEFT_GEN_A_CORE
Pt will require a rolling walker at home due to their diagnosis of weakness to help complete their MRADLs    ACP  [id number]

## 2025-03-30 NOTE — DISCHARGE NOTE NURSING/CASE MANAGEMENT/SOCIAL WORK - PATIENT PORTAL LINK FT
You can access the FollowMyHealth Patient Portal offered by [facility] by registering at the following website: [email or web address]. By joining [facility]’s FollowMyHealth portal, you will also be able to view your health information using other applications (apps) compatible with our system.

## 2025-03-30 NOTE — DISCHARGE NOTE PROVIDER - ATTENDING DISCHARGE PHYSICAL EXAMINATION:
Patient doing well overall.     T(C): 37 (03-30-25 @ 10:20), Max: 37.1 (03-29-25 @ 21:25)  HR: 93 (03-30-25 @ 10:20) (82 - 93)  BP: 125/60 (03-30-25 @ 10:20) (109/55 - 125/60)  RR: 18 (03-30-25 @ 10:20) (17 - 18)  SpO2: 98% (03-30-25 @ 10:20) (98% - 99%)    CONSTITUTIONAL: Well groomed, elderly female   RESP: No respiratory distress, no use of accessory muscles; CTA b/l, no WRR  CV: RRR, +S1S2, no MRG   GI: Soft, NT, ND, no rebound, no guarding; no palpable masses; no hepatosplenomegaly  SKIN: No rashes or ulcers noted; no subcutaneous nodules or induration palpable  NEURO: normal reflexes in upper and lower extremities, sensation intact in upper and lower extremities b/l to light touch   PSYCH: A+O x 3, mood and affect appropriate, recent/remote memory intact

## 2025-03-30 NOTE — DISCHARGE NOTE NURSING/CASE MANAGEMENT/SOCIAL WORK - FINANCIAL ASSISTANCE
[facility] provides services at a reduced cost to those who are determined to be eligible through [facility]’s financial assistance program. Information regarding [facility]’s financial assistance program can be found by going to [email or web address] or by calling [phone].

## 2025-03-30 NOTE — DISCHARGE NOTE NURSING/CASE MANAGEMENT/SOCIAL WORK - NSDCPEFALRISK_GEN_ALL_CORE
For information on Fall & Injury Prevention, visit: [email or web address] OR  [email or web address] OR  https://www.cdc.gov/steadi/patient.html

## 2025-03-30 NOTE — DISCHARGE NOTE PROVIDER - HOSPITAL COURSE
HPI:  89F DM2, HTN, HLD p/w lower abdominal pain for the last few days. During this time had frequent, smaller caliber than usual (and sometimes watery) stools, and endorses difficulty defecating. No BRBPR, hematochezia. Abd pain is crampy, generalized, not improving. Pt took home UTI test that was positive and was prescribed macrobid yesterday, of which she took 1 dose without relief. Pt lives at home with daughter and granddaughter, as well as a 24-hour aide. She ambulates with a cane but is unsteady. Family also endorse pt having episodes of confusion and agitation/sundowning. (28 Mar 2025 15:04)      Patient presents watery stools was found to have CT scan that showed colitis. Patient had no bouts of diarrhea since admission. She states that she feels well overall. Will empirically treat with zosyn and transition to cipro + flagyl fro total 7 day course of antibiotics. Patient safe and stable for discharge.

## 2025-04-02 LAB
CULTURE RESULTS: SIGNIFICANT CHANGE UP
CULTURE RESULTS: SIGNIFICANT CHANGE UP
SPECIMEN SOURCE: SIGNIFICANT CHANGE UP
SPECIMEN SOURCE: SIGNIFICANT CHANGE UP